# Patient Record
Sex: FEMALE | Race: WHITE | Employment: OTHER | URBAN - METROPOLITAN AREA
[De-identification: names, ages, dates, MRNs, and addresses within clinical notes are randomized per-mention and may not be internally consistent; named-entity substitution may affect disease eponyms.]

---

## 2017-02-07 ENCOUNTER — OFFICE VISIT (OUTPATIENT)
Dept: CARDIOLOGY CLINIC | Age: 77
End: 2017-02-07

## 2017-02-07 VITALS
RESPIRATION RATE: 16 BRPM | HEIGHT: 60 IN | WEIGHT: 241 LBS | SYSTOLIC BLOOD PRESSURE: 102 MMHG | OXYGEN SATURATION: 92 % | DIASTOLIC BLOOD PRESSURE: 58 MMHG | HEART RATE: 81 BPM | BODY MASS INDEX: 47.32 KG/M2

## 2017-02-07 DIAGNOSIS — Z95.2 S/P AVR: Primary | ICD-10-CM

## 2017-02-07 PROCEDURE — G8484 FLU IMMUNIZE NO ADMIN: HCPCS | Performed by: INTERNAL MEDICINE

## 2017-02-07 PROCEDURE — G8419 CALC BMI OUT NRM PARAM NOF/U: HCPCS | Performed by: INTERNAL MEDICINE

## 2017-02-07 PROCEDURE — 1036F TOBACCO NON-USER: CPT | Performed by: INTERNAL MEDICINE

## 2017-02-07 PROCEDURE — 99214 OFFICE O/P EST MOD 30 MIN: CPT | Performed by: INTERNAL MEDICINE

## 2017-02-07 PROCEDURE — 1123F ACP DISCUSS/DSCN MKR DOCD: CPT | Performed by: INTERNAL MEDICINE

## 2017-02-07 PROCEDURE — G8427 DOCREV CUR MEDS BY ELIG CLIN: HCPCS | Performed by: INTERNAL MEDICINE

## 2017-02-07 PROCEDURE — G8399 PT W/DXA RESULTS DOCUMENT: HCPCS | Performed by: INTERNAL MEDICINE

## 2017-02-07 PROCEDURE — G8598 ASA/ANTIPLAT THER USED: HCPCS | Performed by: INTERNAL MEDICINE

## 2017-02-07 PROCEDURE — 4040F PNEUMOC VAC/ADMIN/RCVD: CPT | Performed by: INTERNAL MEDICINE

## 2017-02-07 PROCEDURE — 1090F PRES/ABSN URINE INCON ASSESS: CPT | Performed by: INTERNAL MEDICINE

## 2017-05-15 ENCOUNTER — HOSPITAL ENCOUNTER (OUTPATIENT)
Dept: OTHER | Age: 77
Discharge: HOME | End: 2017-05-15
Attending: INTERNAL MEDICINE | Admitting: INTERNAL MEDICINE

## 2017-05-15 LAB
FERRITIN: 88 NG/ML (ref 15–150)
IRON: 108 UG/DL (ref 37–145)
PCT TRANSFERRIN: 46 % (ref 10–44)
TOTAL IRON BINDING CAPACITY: 233 UG/DL (ref 250–450)
UNSATURATED IRON BINDING CAPACITY: 125 UG/DL (ref 110–370)

## 2017-10-12 PROBLEM — I50.33 ACUTE ON CHRONIC DIASTOLIC HEART FAILURE (HCC): Status: ACTIVE | Noted: 2017-10-12

## 2017-10-12 PROBLEM — J44.9 COPD (CHRONIC OBSTRUCTIVE PULMONARY DISEASE) (HCC): Status: ACTIVE | Noted: 2017-10-12

## 2017-10-12 PROBLEM — G47.33 OSA (OBSTRUCTIVE SLEEP APNEA): Chronic | Status: ACTIVE | Noted: 2017-10-12

## 2017-11-07 ENCOUNTER — OFFICE VISIT (OUTPATIENT)
Dept: CARDIOLOGY CLINIC | Age: 77
End: 2017-11-07

## 2017-11-07 VITALS
BODY MASS INDEX: 43.98 KG/M2 | WEIGHT: 224 LBS | HEART RATE: 74 BPM | HEIGHT: 60 IN | SYSTOLIC BLOOD PRESSURE: 136 MMHG | DIASTOLIC BLOOD PRESSURE: 80 MMHG

## 2017-11-07 DIAGNOSIS — I05.2 MITRAL STENOSIS WITH INSUFFICIENCY, UNSPECIFIED ETIOLOGY: ICD-10-CM

## 2017-11-07 DIAGNOSIS — Z95.2 S/P AVR: Primary | ICD-10-CM

## 2017-11-07 PROCEDURE — G8427 DOCREV CUR MEDS BY ELIG CLIN: HCPCS | Performed by: INTERNAL MEDICINE

## 2017-11-07 PROCEDURE — 99214 OFFICE O/P EST MOD 30 MIN: CPT | Performed by: INTERNAL MEDICINE

## 2017-11-07 PROCEDURE — G8598 ASA/ANTIPLAT THER USED: HCPCS | Performed by: INTERNAL MEDICINE

## 2017-11-07 PROCEDURE — G8399 PT W/DXA RESULTS DOCUMENT: HCPCS | Performed by: INTERNAL MEDICINE

## 2017-11-07 PROCEDURE — 4040F PNEUMOC VAC/ADMIN/RCVD: CPT | Performed by: INTERNAL MEDICINE

## 2017-11-07 PROCEDURE — 1090F PRES/ABSN URINE INCON ASSESS: CPT | Performed by: INTERNAL MEDICINE

## 2017-11-07 PROCEDURE — G8417 CALC BMI ABV UP PARAM F/U: HCPCS | Performed by: INTERNAL MEDICINE

## 2017-11-07 PROCEDURE — 1036F TOBACCO NON-USER: CPT | Performed by: INTERNAL MEDICINE

## 2017-11-07 PROCEDURE — 1111F DSCHRG MED/CURRENT MED MERGE: CPT | Performed by: INTERNAL MEDICINE

## 2017-11-07 PROCEDURE — G8484 FLU IMMUNIZE NO ADMIN: HCPCS | Performed by: INTERNAL MEDICINE

## 2017-11-07 PROCEDURE — 1123F ACP DISCUSS/DSCN MKR DOCD: CPT | Performed by: INTERNAL MEDICINE

## 2017-11-07 NOTE — PROGRESS NOTES
CARDIOLOGY NOTE      11/7/2017    RE: Karina Lott  (1940)                               TO:  Dr. Anastasiya Severino MD      Thank you for involving me in taking care of your  patient Karina Lott, who is a  76y.o. year old      female with past medical  history of  CAD, HTN, hyperlipidimea, AVR , severe MS  is  seen today Patient  during this  visit has severe unchanged SOB, no CP was in hospital for CHF has seen CTS. Nohemy Ely Vitals:    11/07/17 1424   BP: 136/80   Pulse: 74       Current Outpatient Prescriptions   Medication Sig Dispense Refill    furosemide (LASIX) 40 MG tablet Take 1 tablet by mouth 2 times daily 60 tablet 3    hydrALAZINE (APRESOLINE) 25 MG tablet Take 25 mg by mouth 3 times daily      Cyanocobalamin (VITAMIN B 12 PO) Take 1,000 mcg by mouth daily      glimepiride (AMARYL) 2 MG tablet Take 2 mg by mouth every morning (before breakfast)      budesonide (PULMICORT) 0.5 MG/2ML nebulizer suspension Take 2 mLs by nebulization 2 times daily Dx Code COPD J44.9 60 ampule 5    ipratropium-albuterol (DUONEB) 0.5-2.5 (3) MG/3ML SOLN nebulizer solution Inhale 3 mLs into the lungs every 6 hours as needed for Shortness of Breath 360 mL 5    albuterol sulfate HFA (PROAIR HFA) 108 (90 BASE) MCG/ACT inhaler Inhale 2 puffs into the lungs 4 times daily 1 Inhaler 5    Respiratory Therapy Supplies (NEBULIZER AIR TUBE/PLUGS) MISC 1 Device by Does not apply route as needed (as needed) 1 each 0    omeprazole (PRILOSEC) 40 MG capsule Take 40 mg by mouth daily       metoprolol (LOPRESSOR) 25 MG tablet Take 25 mg by mouth 2 times daily.  losartan (COZAAR) 100 MG tablet Take 100 mg by mouth daily.  potassium chloride (K-DUR) 10 MEQ tablet Take 10 mEq by mouth daily.  multivitamin (THERAGRAN) per tablet Take 1 tablet by mouth daily.  simvastatin (ZOCOR) 20 MG tablet Take 20 mg by mouth nightly.  aspirin 81 MG tablet Take 81 mg by mouth daily.         vitamin D (ERGOCALCIFEROL) 52242 UNITS CAPS capsule Take 50,000 Units by mouth once a week 10/12/17 Patient states she takes this on Thursdays      ferrous sulfate 325 (65 FE) MG tablet Take 325 mg by mouth daily (with breakfast)        No current facility-administered medications for this visit. Allergies: Lisinopril  Past Medical History:   Diagnosis Date    CAD (coronary artery disease)     [facility]     Diabetes mellitus ([facility]     Gallstones     H/O cardiovascular stress test 9/2007, 9/2009, 7/2010 7/6/2010-Rest ef is 67%. Global LV systolic function is normal. No ECG changes. Unremarkable pharmacological stress test.    H/O complete electrocardiogram 5/13/2010    H/O Doppler ultrasound carotid doppler 2/2009, 8/2010 8/25/2010-heterogeneous irregular athresclerotic plaque in the R internal carotid artery.  H/O echocardiogram 11/07/2013    EF=>55%, normal LV systolic function, mild mitral regurg, severe mitral stenosis, severe pulmonary HTN, bioprosthetic aortic valve.  H/O echocardiogram 10/01/15    EF 60% Mildly hypertrophied LV with normal systolic function. prosthetic valve noted in aortic position. Mod pulmon HTN.  MR does not seem to be severe as in the past the mitral valve area 2.16 but heavily calcified and the mitral valve still appears to be stenotic.     H/O echocardiogram 07/13/2016    EF 60%, Mild LVH, Mod dilated LA, Mod Mitral Stenosis, Severe Pulm HTN, Significant valvular disease, Severe MS by pressure gradient    Hx of CABG     Hyperlipidemia     Hypertension     Murmur 1987    S/P AVR     Type II or unspecified type diabetes mellitus without mention of complication, not stated as uncontrolled      Past Surgical History:   Procedure Laterality Date    CARDIAC SURGERY      CORONARY ARTERY BYPASS GRAFT  4/2010    [facility]    CORONARY ARTERY BYPASS GRAFT  4/2010    [facility]    JOINT REPLACEMENT Bilateral     knees    KNEE SURGERY (L) 10/12/2017    AST 19 10/12/2017     TSH:    Lab Results   Component Value Date    TSH 3.0 01/14/2014         QUALITY MEASURES:  CAD:  Yes   CHOL LOWERING:  Yes- if No Why  ANTIPLATELET:  Yes - if No why  BETA BLOCKER    yes,   IF  NO WHY  SMOKING HISTORY no COUNSELLED no  ATRIAL FIBRILLATIONno ANTICOAG: no    Assessment/ Plan:     Patient seen , interviewed and examined        -   CONGESTIVE HEART FAILURE:  Patient has    diastolic  heart failure. Patient is currently   very symptomatic. Etiology of CHF is  heart valve disease  . Salt restriction emphasized. ,Encouraged daily monitoring of the patient's weight,Fluid intake and output    -Changes in medicines:   No  -Test ordered today;  No       This patient has NYHA   Class IV (Severe) CHF  resulting in the inability to carry on any physical activity without discomfort. Symptoms of heart failure or the anginal syndrome may be present even at rest. If any physical activity is undertaken, discomfort is increased. -     CORONARY ARTERY DISEASE: Patient  currently as per anginal classification has   CCS III - Symptoms with everyday living activities, i.e., moderate limitation           - changes in  treatment:   no  All CAD tests available in records reviewed. -  Hypertension: Patients blood pressure is normal. Patient is advised about low sodium diet. Present medical regimen will not be changed. -  LIPID MANAGEMENT:  Available lipid  lab data reviewed  and patient was given dietary advice. -   Changes  in medicines made: No     -  Test ordered today : No           -   DIABETES MELLITUS: Available pertinent lab data reviewed   and  patient was given dietary advice      -   Changes  in medicines made: No    -   Test ordered today : No            - AVR stable echo reviewed    - Severe MS     Unchanged, not a candidate for MVR, heavily calcified, no new options available yet  ?  Using TAVR prosthesis for MVR, will [facility] next Monday ( going there to do TAVR)      - severe pul HTN unchanged    echo reviewed

## 2017-11-21 ENCOUNTER — HOSPITAL ENCOUNTER (OUTPATIENT)
Dept: GENERAL RADIOLOGY | Age: 77
Discharge: HOME | End: 2017-11-21
Attending: INTERNAL MEDICINE | Admitting: INTERNAL MEDICINE

## 2017-11-21 LAB
ALBUMIN SERPL-MCNC: 4.2 GM/DL (ref 3.4–5)
ALP BLD-CCNC: 64 IU/L (ref 40–129)
ALT SERPL-CCNC: 8 U/L (ref 10–40)
ANION GAP SERPL CALCULATED.3IONS-SCNC: 14 MMOL/L (ref 4–16)
AST SERPL-CCNC: 19 IU/L (ref 15–37)
BACTERIA: ABNORMAL /HPF
BILIRUB SERPL-MCNC: 0.8 MG/DL (ref 0–1)
BILIRUBIN URINE: NEGATIVE MG/DL
BLOOD, URINE: NEGATIVE
BUN BLDV-MCNC: 27 MG/DL (ref 6–23)
CALCIUM SERPL-MCNC: 8.9 MG/DL (ref 8.3–10.6)
CHLORIDE BLD-SCNC: 98 MMOL/L (ref 99–110)
CLARITY: ABNORMAL
CO2: 27 MMOL/L (ref 21–32)
COLOR: ABNORMAL
CREAT SERPL-MCNC: 1.8 MG/DL (ref 0.6–1.1)
GFR AFRICAN AMERICAN: 33 ML/MIN/1.73M2
GFR NON-AFRICAN AMERICAN: 27 ML/MIN/1.73M2
GLUCOSE FASTING: 129 MG/DL (ref 70–99)
GLUCOSE, URINE: NEGATIVE MG/DL
KETONES, URINE: NEGATIVE MG/DL
LEUKOCYTE ESTERASE, URINE: ABNORMAL
MUCUS: ABNORMAL HPF
NITRITE URINE, QUANTITATIVE: NEGATIVE
PH, URINE: 6 (ref 5–8)
POTASSIUM SERPL-SCNC: 4.5 MMOL/L (ref 3.5–5.1)
PROTEIN UA: NEGATIVE MG/DL
RBC URINE: 3 /HPF (ref 0–6)
SODIUM BLD-SCNC: 139 MMOL/L (ref 135–145)
SPECIFIC GRAVITY UA: 1 (ref 1–1.03)
SQUAMOUS EPITHELIAL: 2 /HPF
TOTAL PROTEIN: 6.1 GM/DL (ref 6.4–8.2)
TRICHOMONAS: ABNORMAL /HPF
URIC ACID: 11 MG/DL (ref 2.6–6)
UROBILINOGEN, URINE: NORMAL MG/DL (ref 0.2–1)
WBC CLUMP: ABNORMAL /HPF
WBC UA: 181 /HPF (ref 0–5)

## 2018-01-01 ENCOUNTER — HOSPITAL ENCOUNTER (OUTPATIENT)
Age: 78
Setting detail: SPECIMEN
Discharge: HOME | End: 2018-11-02

## 2018-01-01 ENCOUNTER — HOSPITAL ENCOUNTER (OUTPATIENT)
Age: 78
Setting detail: SPECIMEN
Discharge: HOME | DRG: 291 | End: 2018-12-11
Payer: MEDICARE

## 2018-01-01 ENCOUNTER — HOSPITAL ENCOUNTER (OUTPATIENT)
Age: 78
Discharge: HOME | End: 2018-11-13

## 2018-01-01 ENCOUNTER — HOSPITAL ENCOUNTER (OUTPATIENT)
Dept: GENERAL RADIOLOGY | Age: 78
Discharge: HOME | End: 2018-02-14
Attending: INTERNAL MEDICINE | Admitting: INTERNAL MEDICINE

## 2018-01-01 ENCOUNTER — APPOINTMENT (OUTPATIENT)
Dept: GENERAL RADIOLOGY | Age: 78
DRG: 291 | End: 2018-01-01
Payer: MEDICARE

## 2018-01-01 ENCOUNTER — HOSPITAL ENCOUNTER (OUTPATIENT)
Age: 78
Discharge: HOME | End: 2018-11-27

## 2018-01-01 ENCOUNTER — HOSPITAL ENCOUNTER (OUTPATIENT)
Dept: GENERAL RADIOLOGY | Age: 78
Discharge: HOME | End: 2018-01-02
Attending: FAMILY MEDICINE | Admitting: FAMILY MEDICINE

## 2018-01-01 ENCOUNTER — TELEPHONE (OUTPATIENT)
Dept: CARDIOLOGY CLINIC | Age: 78
End: 2018-01-01

## 2018-01-01 ENCOUNTER — HOSPITAL ENCOUNTER (OUTPATIENT)
Dept: GENERAL RADIOLOGY | Age: 78
Discharge: HOME | End: 2018-06-07
Attending: INTERNAL MEDICINE | Admitting: INTERNAL MEDICINE

## 2018-01-01 ENCOUNTER — CARE COORDINATION (OUTPATIENT)
Dept: CASE MANAGEMENT | Age: 78
End: 2018-01-01

## 2018-01-01 ENCOUNTER — APPOINTMENT (OUTPATIENT)
Dept: INTERVENTIONAL RADIOLOGY/VASCULAR | Age: 78
DRG: 291 | End: 2018-01-01
Payer: MEDICARE

## 2018-01-01 ENCOUNTER — OFFICE VISIT (OUTPATIENT)
Dept: CARDIOLOGY CLINIC | Age: 78
End: 2018-01-01

## 2018-01-01 ENCOUNTER — HOSPITAL ENCOUNTER (OUTPATIENT)
Dept: GENERAL RADIOLOGY | Age: 78
Discharge: HOME | End: 2018-03-07
Attending: INTERNAL MEDICINE | Admitting: INTERNAL MEDICINE

## 2018-01-01 ENCOUNTER — HOSPITAL ENCOUNTER (OUTPATIENT)
Dept: GENERAL RADIOLOGY | Age: 78
Discharge: HOME | End: 2018-01-02
Attending: INTERNAL MEDICINE | Admitting: INTERNAL MEDICINE

## 2018-01-01 ENCOUNTER — APPOINTMENT (OUTPATIENT)
Dept: ULTRASOUND IMAGING | Age: 78
DRG: 291 | End: 2018-01-01
Payer: MEDICARE

## 2018-01-01 ENCOUNTER — HOSPITAL ENCOUNTER (OUTPATIENT)
Age: 78
Setting detail: SPECIMEN
Discharge: HOME | End: 2018-11-06

## 2018-01-01 ENCOUNTER — HOSPITAL ENCOUNTER (OUTPATIENT)
Age: 78
Discharge: HOME | End: 2018-11-16

## 2018-01-01 ENCOUNTER — APPOINTMENT (OUTPATIENT)
Dept: CT IMAGING | Age: 78
DRG: 291 | End: 2018-01-01
Payer: MEDICARE

## 2018-01-01 ENCOUNTER — HOSPITAL ENCOUNTER (OUTPATIENT)
Age: 78
Discharge: HOME | End: 2018-11-09
Payer: MEDICARE

## 2018-01-01 ENCOUNTER — HOSPITAL ENCOUNTER (OUTPATIENT)
Dept: GENERAL RADIOLOGY | Age: 78
Discharge: HOME | End: 2018-06-14
Attending: FAMILY MEDICINE | Admitting: FAMILY MEDICINE

## 2018-01-01 ENCOUNTER — HOSPITAL ENCOUNTER (OUTPATIENT)
Age: 78
Setting detail: SPECIMEN
Discharge: HOME | End: 2018-11-20
Payer: MEDICARE

## 2018-01-01 ENCOUNTER — HOSPITAL ENCOUNTER (OUTPATIENT)
Age: 78
Setting detail: SPECIMEN
Discharge: HOME | End: 2018-12-04

## 2018-01-01 ENCOUNTER — HOSPITAL ENCOUNTER (INPATIENT)
Age: 78
LOS: 9 days | Discharge: NURSING, REHAB OR LONG-TERM CARE | DRG: 291 | End: 2018-10-30
Attending: EMERGENCY MEDICINE | Admitting: HOSPITALIST
Payer: MEDICARE

## 2018-01-01 ENCOUNTER — HOSPITAL ENCOUNTER (INPATIENT)
Age: 78
LOS: 8 days | DRG: 291 | End: 2018-12-19
Attending: EMERGENCY MEDICINE | Admitting: INTERNAL MEDICINE
Payer: MEDICARE

## 2018-01-01 VITALS
TEMPERATURE: 99.1 F | HEIGHT: 60 IN | SYSTOLIC BLOOD PRESSURE: 64 MMHG | OXYGEN SATURATION: 98 % | WEIGHT: 229.28 LBS | BODY MASS INDEX: 45.01 KG/M2 | HEART RATE: 65 BPM | RESPIRATION RATE: 21 BRPM | DIASTOLIC BLOOD PRESSURE: 35 MMHG

## 2018-01-01 VITALS
SYSTOLIC BLOOD PRESSURE: 126 MMHG | RESPIRATION RATE: 22 BRPM | TEMPERATURE: 98.3 F | HEART RATE: 85 BPM | HEIGHT: 60 IN | WEIGHT: 213.9 LBS | DIASTOLIC BLOOD PRESSURE: 61 MMHG | BODY MASS INDEX: 41.99 KG/M2 | OXYGEN SATURATION: 98 %

## 2018-01-01 VITALS
SYSTOLIC BLOOD PRESSURE: 114 MMHG | HEIGHT: 60 IN | HEART RATE: 68 BPM | BODY MASS INDEX: 40.82 KG/M2 | DIASTOLIC BLOOD PRESSURE: 70 MMHG

## 2018-01-01 DIAGNOSIS — I25.810 CORONARY ARTERY DISEASE INVOLVING CORONARY BYPASS GRAFT OF NATIVE HEART WITHOUT ANGINA PECTORIS: Primary | ICD-10-CM

## 2018-01-01 DIAGNOSIS — I51.7 CARDIOMEGALY: ICD-10-CM

## 2018-01-01 DIAGNOSIS — R79.89 ELEVATED BRAIN NATRIURETIC PEPTIDE (BNP) LEVEL: ICD-10-CM

## 2018-01-01 DIAGNOSIS — I50.9 ACUTE ON CHRONIC CONGESTIVE HEART FAILURE, UNSPECIFIED HEART FAILURE TYPE (HCC): ICD-10-CM

## 2018-01-01 DIAGNOSIS — R77.8 TROPONIN LEVEL ELEVATED: ICD-10-CM

## 2018-01-01 DIAGNOSIS — J90 PLEURAL EFFUSION: ICD-10-CM

## 2018-01-01 DIAGNOSIS — Z95.2 S/P AVR: Chronic | ICD-10-CM

## 2018-01-01 DIAGNOSIS — R09.02 HYPOXIA: ICD-10-CM

## 2018-01-01 DIAGNOSIS — J96.91 RESPIRATORY FAILURE WITH HYPOXIA, UNSPECIFIED CHRONICITY (HCC): Primary | ICD-10-CM

## 2018-01-01 DIAGNOSIS — R77.8 ELEVATED TROPONIN: ICD-10-CM

## 2018-01-01 DIAGNOSIS — R79.89 SERUM CREATININE RAISED: ICD-10-CM

## 2018-01-01 DIAGNOSIS — R06.00 DYSPNEA, UNSPECIFIED TYPE: Primary | ICD-10-CM

## 2018-01-01 LAB
ALBUMIN SERPL-MCNC: 3.5 GM/DL (ref 3.4–5)
ALBUMIN SERPL-MCNC: 3.6 GM/DL (ref 3.4–5)
ALBUMIN SERPL-MCNC: 3.7 GM/DL (ref 3.4–5)
ALBUMIN SERPL-MCNC: 3.9 GM/DL (ref 3.4–5)
ALBUMIN SERPL-MCNC: 4.1 GM/DL (ref 3.4–5)
ALBUMIN SERPL-MCNC: 4.1 GM/DL (ref 3.4–5)
ALBUMIN SERPL-MCNC: 4.5 GM/DL (ref 3.4–5)
ALP BLD-CCNC: 57 IU/L (ref 40–129)
ALP BLD-CCNC: 64 IU/L (ref 40–128)
ALP BLD-CCNC: 73 IU/L (ref 40–128)
ALP BLD-CCNC: 85 IU/L (ref 40–128)
ALT SERPL-CCNC: 10 U/L (ref 10–40)
ALT SERPL-CCNC: 10 U/L (ref 10–40)
ALT SERPL-CCNC: 8 U/L (ref 10–40)
ALT SERPL-CCNC: 9 U/L (ref 10–40)
ANION GAP SERPL CALCULATED.3IONS-SCNC: 11 MMOL/L (ref 4–16)
ANION GAP SERPL CALCULATED.3IONS-SCNC: 12 MMOL/L (ref 4–16)
ANION GAP SERPL CALCULATED.3IONS-SCNC: 13 MMOL/L (ref 4–16)
ANION GAP SERPL CALCULATED.3IONS-SCNC: 14 MMOL/L (ref 4–16)
ANION GAP SERPL CALCULATED.3IONS-SCNC: 15 MMOL/L (ref 4–16)
ANION GAP SERPL CALCULATED.3IONS-SCNC: 16 MMOL/L (ref 4–16)
ANION GAP SERPL CALCULATED.3IONS-SCNC: 17 MMOL/L (ref 4–16)
ANION GAP SERPL CALCULATED.3IONS-SCNC: 18 MMOL/L (ref 4–16)
ANION GAP SERPL CALCULATED.3IONS-SCNC: 19 MMOL/L (ref 4–16)
ANION GAP SERPL CALCULATED.3IONS-SCNC: 19 MMOL/L (ref 4–16)
ANION GAP SERPL CALCULATED.3IONS-SCNC: 20 MMOL/L (ref 4–16)
ANION GAP SERPL CALCULATED.3IONS-SCNC: 21 MMOL/L (ref 4–16)
ANION GAP SERPL CALCULATED.3IONS-SCNC: 25 MMOL/L (ref 4–16)
ANION GAP SERPL CALCULATED.3IONS-SCNC: 27 MMOL/L (ref 4–16)
ANION GAP SERPL CALCULATED.3IONS-SCNC: 9 MMOL/L (ref 4–16)
APTT: 35.5 SECONDS (ref 21.2–33)
AST SERPL-CCNC: 17 IU/L (ref 15–37)
AST SERPL-CCNC: 18 IU/L (ref 15–37)
AST SERPL-CCNC: 22 IU/L (ref 15–37)
AST SERPL-CCNC: 38 IU/L (ref 15–37)
BACTERIA: ABNORMAL /HPF
BASE EXCESS MIXED: 6.4 (ref 0–2.3)
BASOPHILS ABSOLUTE: 0 K/CU MM
BASOPHILS ABSOLUTE: 0.1 K/CU MM
BASOPHILS RELATIVE PERCENT: 0.2 % (ref 0–1)
BASOPHILS RELATIVE PERCENT: 0.3 % (ref 0–1)
BASOPHILS RELATIVE PERCENT: 0.4 % (ref 0–1)
BASOPHILS RELATIVE PERCENT: 0.5 % (ref 0–1)
BASOPHILS RELATIVE PERCENT: 0.5 % (ref 0–1)
BASOPHILS RELATIVE PERCENT: 0.7 % (ref 0–1)
BASOPHILS RELATIVE PERCENT: 0.7 % (ref 0–1)
BASOPHILS RELATIVE PERCENT: 0.9 % (ref 0–1)
BILIRUB SERPL-MCNC: 0.6 MG/DL (ref 0–1)
BILIRUB SERPL-MCNC: 0.7 MG/DL (ref 0–1)
BILIRUB SERPL-MCNC: 0.8 MG/DL (ref 0–1)
BILIRUB SERPL-MCNC: 0.9 MG/DL (ref 0–1)
BILIRUBIN URINE: NEGATIVE MG/DL
BLOOD, URINE: ABNORMAL
BUN BLDV-MCNC: 104 MG/DL (ref 6–23)
BUN BLDV-MCNC: 110 MG/DL (ref 6–23)
BUN BLDV-MCNC: 162 MG/DL (ref 6–23)
BUN BLDV-MCNC: 39 MG/DL (ref 6–23)
BUN BLDV-MCNC: 43 MG/DL (ref 6–23)
BUN BLDV-MCNC: 45 MG/DL (ref 6–23)
BUN BLDV-MCNC: 46 MG/DL (ref 6–23)
BUN BLDV-MCNC: 46 MG/DL (ref 6–23)
BUN BLDV-MCNC: 52 MG/DL (ref 6–23)
BUN BLDV-MCNC: 59 MG/DL (ref 6–23)
BUN BLDV-MCNC: 59 MG/DL (ref 6–23)
BUN BLDV-MCNC: 65 MG/DL (ref 6–23)
BUN BLDV-MCNC: 66 MG/DL (ref 6–23)
BUN BLDV-MCNC: 73 MG/DL (ref 6–23)
BUN BLDV-MCNC: 73 MG/DL (ref 6–23)
BUN BLDV-MCNC: 75 MG/DL (ref 6–23)
BUN BLDV-MCNC: 75 MG/DL (ref 6–23)
BUN BLDV-MCNC: 76 MG/DL (ref 6–23)
BUN BLDV-MCNC: 77 MG/DL (ref 6–23)
BUN BLDV-MCNC: 78 MG/DL (ref 6–23)
BUN BLDV-MCNC: 79 MG/DL (ref 6–23)
BUN BLDV-MCNC: 80 MG/DL (ref 6–23)
BUN BLDV-MCNC: 83 MG/DL (ref 6–23)
BUN BLDV-MCNC: 90 MG/DL (ref 6–23)
BUN BLDV-MCNC: 91 MG/DL (ref 6–23)
CALCIUM SERPL-MCNC: 6.5 MG/DL (ref 8.3–10.6)
CALCIUM SERPL-MCNC: 7.5 MG/DL (ref 8.3–10.6)
CALCIUM SERPL-MCNC: 8.3 MG/DL (ref 8.3–10.6)
CALCIUM SERPL-MCNC: 8.4 MG/DL (ref 8.3–10.6)
CALCIUM SERPL-MCNC: 8.4 MG/DL (ref 8.3–10.6)
CALCIUM SERPL-MCNC: 8.5 MG/DL (ref 8.3–10.6)
CALCIUM SERPL-MCNC: 8.6 MG/DL (ref 8.3–10.6)
CALCIUM SERPL-MCNC: 8.6 MG/DL (ref 8.3–10.6)
CALCIUM SERPL-MCNC: 8.7 MG/DL (ref 8.3–10.6)
CALCIUM SERPL-MCNC: 8.8 MG/DL (ref 8.3–10.6)
CALCIUM SERPL-MCNC: 8.9 MG/DL (ref 8.3–10.6)
CALCIUM SERPL-MCNC: 9 MG/DL (ref 8.3–10.6)
CALCIUM SERPL-MCNC: 9.1 MG/DL (ref 8.3–10.6)
CALCIUM SERPL-MCNC: 9.2 MG/DL (ref 8.3–10.6)
CALCIUM SERPL-MCNC: 9.2 MG/DL (ref 8.3–10.6)
CALCIUM SERPL-MCNC: 9.3 MG/DL (ref 8.3–10.6)
CALCIUM SERPL-MCNC: 9.6 MG/DL (ref 8.3–10.6)
CHLORIDE BLD-SCNC: 100 MMOL/L (ref 99–110)
CHLORIDE BLD-SCNC: 101 MMOL/L (ref 99–110)
CHLORIDE BLD-SCNC: 101 MMOL/L (ref 99–110)
CHLORIDE BLD-SCNC: 102 MMOL/L (ref 99–110)
CHLORIDE BLD-SCNC: 104 MMOL/L (ref 99–110)
CHLORIDE BLD-SCNC: 91 MMOL/L (ref 99–110)
CHLORIDE BLD-SCNC: 91 MMOL/L (ref 99–110)
CHLORIDE BLD-SCNC: 92 MMOL/L (ref 99–110)
CHLORIDE BLD-SCNC: 93 MMOL/L (ref 99–110)
CHLORIDE BLD-SCNC: 94 MMOL/L (ref 99–110)
CHLORIDE BLD-SCNC: 95 MMOL/L (ref 99–110)
CHLORIDE BLD-SCNC: 96 MMOL/L (ref 99–110)
CHLORIDE BLD-SCNC: 97 MMOL/L (ref 99–110)
CHLORIDE BLD-SCNC: 98 MMOL/L (ref 99–110)
CHLORIDE BLD-SCNC: 99 MMOL/L (ref 99–110)
CHOLESTEROL: 125 MG/DL
CHOLESTEROL: 83 MG/DL
CLARITY: ABNORMAL
CO2: 20 MMOL/L (ref 21–32)
CO2: 21 MMOL/L (ref 21–32)
CO2: 23 MMOL/L (ref 21–32)
CO2: 23 MMOL/L (ref 21–32)
CO2: 24 MMOL/L (ref 21–32)
CO2: 25 MMOL/L (ref 21–32)
CO2: 25 MMOL/L (ref 21–32)
CO2: 26 MMOL/L (ref 21–32)
CO2: 27 MMOL/L (ref 21–32)
CO2: 28 MMOL/L (ref 21–32)
CO2: 29 MMOL/L (ref 21–32)
CO2: 29 MMOL/L (ref 21–32)
CO2: 30 MMOL/L (ref 21–32)
CO2: 31 MMOL/L (ref 21–32)
CO2: 31 MMOL/L (ref 21–32)
CO2: 32 MMOL/L (ref 21–32)
CO2: 33 MMOL/L (ref 21–32)
CO2: 33 MMOL/L (ref 21–32)
CO2: 34 MMOL/L (ref 21–32)
CO2: 34 MMOL/L (ref 21–32)
CO2: 35 MMOL/L (ref 21–32)
CO2: 36 MMOL/L (ref 21–32)
COLOR: YELLOW
CREAT SERPL-MCNC: 1.9 MG/DL (ref 0.6–1.1)
CREAT SERPL-MCNC: 2 MG/DL (ref 0.6–1.1)
CREAT SERPL-MCNC: 2.4 MG/DL (ref 0.6–1.1)
CREAT SERPL-MCNC: 2.4 MG/DL (ref 0.6–1.1)
CREAT SERPL-MCNC: 2.5 MG/DL (ref 0.6–1.1)
CREAT SERPL-MCNC: 2.6 MG/DL (ref 0.6–1.1)
CREAT SERPL-MCNC: 2.7 MG/DL (ref 0.6–1.1)
CREAT SERPL-MCNC: 2.7 MG/DL (ref 0.6–1.1)
CREAT SERPL-MCNC: 2.8 MG/DL (ref 0.6–1.1)
CREAT SERPL-MCNC: 2.9 MG/DL (ref 0.6–1.1)
CREAT SERPL-MCNC: 3.2 MG/DL (ref 0.6–1.1)
CREAT SERPL-MCNC: 3.3 MG/DL (ref 0.6–1.1)
CREAT SERPL-MCNC: 3.3 MG/DL (ref 0.6–1.1)
CREAT SERPL-MCNC: 3.6 MG/DL (ref 0.6–1.1)
CREAT SERPL-MCNC: 3.8 MG/DL (ref 0.6–1.1)
CREAT SERPL-MCNC: 3.8 MG/DL (ref 0.6–1.1)
CREAT SERPL-MCNC: 4 MG/DL (ref 0.6–1.1)
CREAT SERPL-MCNC: 4 MG/DL (ref 0.6–1.1)
CREAT SERPL-MCNC: 4.4 MG/DL (ref 0.6–1.1)
CREAT SERPL-MCNC: 4.5 MG/DL (ref 0.6–1.1)
CREAT SERPL-MCNC: 4.8 MG/DL (ref 0.6–1.1)
CREAT SERPL-MCNC: 4.9 MG/DL (ref 0.6–1.1)
CREAT SERPL-MCNC: 5.5 MG/DL (ref 0.6–1.1)
CREAT SERPL-MCNC: 5.5 MG/DL (ref 0.6–1.1)
CREAT SERPL-MCNC: 5.7 MG/DL (ref 0.6–1.1)
CREATININE URINE: 32.5 MG/DL (ref 28–217)
CREATININE URINE: 34 MG/DL (ref 28–217)
CREATININE URINE: 60.2 MG/DL
DIFFERENTIAL TYPE: ABNORMAL
EKG ATRIAL RATE: 312 BPM
EKG DIAGNOSIS: NORMAL
EKG P AXIS: 128 DEGREES
EKG P-R INTERVAL: 246 MS
EKG Q-T INTERVAL: 370 MS
EKG QRS DURATION: 88 MS
EKG QTC CALCULATION (BAZETT): 426 MS
EKG R AXIS: 209 DEGREES
EKG T AXIS: -34 DEGREES
EKG VENTRICULAR RATE: 80 BPM
EOSINOPHILS ABSOLUTE: 0.1 K/CU MM
EOSINOPHILS ABSOLUTE: 0.2 K/CU MM
EOSINOPHILS ABSOLUTE: 0.3 K/CU MM
EOSINOPHILS ABSOLUTE: 0.4 K/CU MM
EOSINOPHILS ABSOLUTE: 0.5 K/CU MM
EOSINOPHILS RELATIVE PERCENT: 0.8 % (ref 0–3)
EOSINOPHILS RELATIVE PERCENT: 1.2 % (ref 0–3)
EOSINOPHILS RELATIVE PERCENT: 1.2 % (ref 0–3)
EOSINOPHILS RELATIVE PERCENT: 2.2 % (ref 0–3)
EOSINOPHILS RELATIVE PERCENT: 2.3 % (ref 0–3)
EOSINOPHILS RELATIVE PERCENT: 2.3 % (ref 0–3)
EOSINOPHILS RELATIVE PERCENT: 2.5 % (ref 0–3)
EOSINOPHILS RELATIVE PERCENT: 2.9 % (ref 0–3)
EOSINOPHILS RELATIVE PERCENT: 3 % (ref 0–3)
EOSINOPHILS RELATIVE PERCENT: 3.2 % (ref 0–3)
EOSINOPHILS RELATIVE PERCENT: 5.4 % (ref 0–3)
ESTIMATED AVERAGE GLUCOSE: 140 MG/DL
ESTIMATED AVERAGE GLUCOSE: 160 MG/DL
GFR AFRICAN AMERICAN: 10 ML/MIN/1.73M2
GFR AFRICAN AMERICAN: 11 ML/MIN/1.73M2
GFR AFRICAN AMERICAN: 11 ML/MIN/1.73M2
GFR AFRICAN AMERICAN: 12 ML/MIN/1.73M2
GFR AFRICAN AMERICAN: 13 ML/MIN/1.73M2
GFR AFRICAN AMERICAN: 13 ML/MIN/1.73M2
GFR AFRICAN AMERICAN: 14 ML/MIN/1.73M2
GFR AFRICAN AMERICAN: 14 ML/MIN/1.73M2
GFR AFRICAN AMERICAN: 15 ML/MIN/1.73M2
GFR AFRICAN AMERICAN: 16 ML/MIN/1.73M2
GFR AFRICAN AMERICAN: 16 ML/MIN/1.73M2
GFR AFRICAN AMERICAN: 17 ML/MIN/1.73M2
GFR AFRICAN AMERICAN: 19 ML/MIN/1.73M2
GFR AFRICAN AMERICAN: 20 ML/MIN/1.73M2
GFR AFRICAN AMERICAN: 21 ML/MIN/1.73M2
GFR AFRICAN AMERICAN: 22 ML/MIN/1.73M2
GFR AFRICAN AMERICAN: 24 ML/MIN/1.73M2
GFR AFRICAN AMERICAN: 24 ML/MIN/1.73M2
GFR AFRICAN AMERICAN: 29 ML/MIN/1.73M2
GFR AFRICAN AMERICAN: 31 ML/MIN/1.73M2
GFR AFRICAN AMERICAN: 9 ML/MIN/1.73M2
GFR NON-AFRICAN AMERICAN: 10 ML/MIN/1.73M2
GFR NON-AFRICAN AMERICAN: 11 ML/MIN/1.73M2
GFR NON-AFRICAN AMERICAN: 12 ML/MIN/1.73M2
GFR NON-AFRICAN AMERICAN: 13 ML/MIN/1.73M2
GFR NON-AFRICAN AMERICAN: 13 ML/MIN/1.73M2
GFR NON-AFRICAN AMERICAN: 14 ML/MIN/1.73M2
GFR NON-AFRICAN AMERICAN: 16 ML/MIN/1.73M2
GFR NON-AFRICAN AMERICAN: 17 ML/MIN/1.73M2
GFR NON-AFRICAN AMERICAN: 17 ML/MIN/1.73M2
GFR NON-AFRICAN AMERICAN: 18 ML/MIN/1.73M2
GFR NON-AFRICAN AMERICAN: 19 ML/MIN/1.73M2
GFR NON-AFRICAN AMERICAN: 24 ML/MIN/1.73M2
GFR NON-AFRICAN AMERICAN: 26 ML/MIN/1.73M2
GFR NON-AFRICAN AMERICAN: 7 ML/MIN/1.73M2
GFR NON-AFRICAN AMERICAN: 9 ML/MIN/1.73M2
GLUCOSE BLD-MCNC: 111 MG/DL (ref 70–99)
GLUCOSE BLD-MCNC: 116 MG/DL (ref 70–99)
GLUCOSE BLD-MCNC: 117 MG/DL (ref 70–99)
GLUCOSE BLD-MCNC: 119 MG/DL (ref 70–99)
GLUCOSE BLD-MCNC: 120 MG/DL (ref 70–99)
GLUCOSE BLD-MCNC: 122 MG/DL (ref 70–99)
GLUCOSE BLD-MCNC: 122 MG/DL (ref 70–99)
GLUCOSE BLD-MCNC: 127 MG/DL (ref 70–99)
GLUCOSE BLD-MCNC: 128 MG/DL (ref 70–99)
GLUCOSE BLD-MCNC: 129 MG/DL (ref 70–99)
GLUCOSE BLD-MCNC: 129 MG/DL (ref 70–99)
GLUCOSE BLD-MCNC: 131 MG/DL (ref 70–99)
GLUCOSE BLD-MCNC: 132 MG/DL (ref 70–99)
GLUCOSE BLD-MCNC: 134 MG/DL (ref 70–99)
GLUCOSE BLD-MCNC: 136 MG/DL (ref 70–99)
GLUCOSE BLD-MCNC: 136 MG/DL (ref 70–99)
GLUCOSE BLD-MCNC: 139 MG/DL (ref 70–99)
GLUCOSE BLD-MCNC: 140 MG/DL (ref 70–99)
GLUCOSE BLD-MCNC: 140 MG/DL (ref 70–99)
GLUCOSE BLD-MCNC: 142 MG/DL (ref 70–99)
GLUCOSE BLD-MCNC: 143 MG/DL (ref 70–99)
GLUCOSE BLD-MCNC: 143 MG/DL (ref 70–99)
GLUCOSE BLD-MCNC: 144 MG/DL (ref 70–99)
GLUCOSE BLD-MCNC: 145 MG/DL (ref 70–99)
GLUCOSE BLD-MCNC: 146 MG/DL (ref 70–99)
GLUCOSE BLD-MCNC: 147 MG/DL (ref 70–99)
GLUCOSE BLD-MCNC: 148 MG/DL (ref 70–99)
GLUCOSE BLD-MCNC: 148 MG/DL (ref 70–99)
GLUCOSE BLD-MCNC: 149 MG/DL (ref 70–99)
GLUCOSE BLD-MCNC: 150 MG/DL (ref 70–99)
GLUCOSE BLD-MCNC: 151 MG/DL (ref 70–99)
GLUCOSE BLD-MCNC: 151 MG/DL (ref 70–99)
GLUCOSE BLD-MCNC: 152 MG/DL (ref 70–99)
GLUCOSE BLD-MCNC: 152 MG/DL (ref 70–99)
GLUCOSE BLD-MCNC: 153 MG/DL (ref 70–99)
GLUCOSE BLD-MCNC: 155 MG/DL (ref 70–99)
GLUCOSE BLD-MCNC: 156 MG/DL (ref 70–99)
GLUCOSE BLD-MCNC: 158 MG/DL (ref 70–99)
GLUCOSE BLD-MCNC: 159 MG/DL (ref 70–99)
GLUCOSE BLD-MCNC: 163 MG/DL (ref 70–99)
GLUCOSE BLD-MCNC: 165 MG/DL (ref 70–99)
GLUCOSE BLD-MCNC: 167 MG/DL (ref 70–99)
GLUCOSE BLD-MCNC: 168 MG/DL (ref 70–99)
GLUCOSE BLD-MCNC: 169 MG/DL (ref 70–99)
GLUCOSE BLD-MCNC: 170 MG/DL (ref 70–99)
GLUCOSE BLD-MCNC: 171 MG/DL (ref 70–99)
GLUCOSE BLD-MCNC: 172 MG/DL (ref 70–99)
GLUCOSE BLD-MCNC: 173 MG/DL (ref 70–99)
GLUCOSE BLD-MCNC: 178 MG/DL (ref 70–99)
GLUCOSE BLD-MCNC: 182 MG/DL (ref 70–99)
GLUCOSE BLD-MCNC: 183 MG/DL (ref 70–99)
GLUCOSE BLD-MCNC: 185 MG/DL (ref 70–99)
GLUCOSE BLD-MCNC: 188 MG/DL (ref 70–99)
GLUCOSE BLD-MCNC: 189 MG/DL (ref 70–99)
GLUCOSE BLD-MCNC: 190 MG/DL (ref 70–99)
GLUCOSE BLD-MCNC: 191 MG/DL (ref 70–99)
GLUCOSE BLD-MCNC: 193 MG/DL (ref 70–99)
GLUCOSE BLD-MCNC: 196 MG/DL (ref 70–99)
GLUCOSE BLD-MCNC: 199 MG/DL (ref 70–99)
GLUCOSE BLD-MCNC: 199 MG/DL (ref 70–99)
GLUCOSE BLD-MCNC: 201 MG/DL (ref 70–99)
GLUCOSE BLD-MCNC: 205 MG/DL (ref 70–99)
GLUCOSE BLD-MCNC: 207 MG/DL (ref 70–99)
GLUCOSE BLD-MCNC: 214 MG/DL (ref 70–99)
GLUCOSE BLD-MCNC: 219 MG/DL (ref 70–99)
GLUCOSE BLD-MCNC: 220 MG/DL (ref 70–99)
GLUCOSE BLD-MCNC: 225 MG/DL (ref 70–99)
GLUCOSE BLD-MCNC: 228 MG/DL (ref 70–99)
GLUCOSE BLD-MCNC: 233 MG/DL (ref 70–99)
GLUCOSE BLD-MCNC: 254 MG/DL (ref 70–99)
GLUCOSE BLD-MCNC: 262 MG/DL (ref 70–99)
GLUCOSE BLD-MCNC: 285 MG/DL (ref 70–99)
GLUCOSE BLD-MCNC: 291 MG/DL (ref 70–99)
GLUCOSE BLD-MCNC: 57 MG/DL (ref 70–99)
GLUCOSE BLD-MCNC: 99 MG/DL (ref 70–99)
GLUCOSE FASTING: 179 MG/DL (ref 70–99)
GLUCOSE, URINE: NEGATIVE MG/DL
HBA1C MFR BLD: 6.5 % (ref 4.2–6.3)
HBA1C MFR BLD: 7.2 % (ref 4.2–6.3)
HBV SURFACE AB TITR SER: <3.5 {TITER}
HCO3 VENOUS: 35.2 MMOL/L (ref 19–25)
HCT VFR BLD CALC: 32.5 % (ref 37–47)
HCT VFR BLD CALC: 32.8 % (ref 37–47)
HCT VFR BLD CALC: 33.7 % (ref 37–47)
HCT VFR BLD CALC: 34.9 % (ref 37–47)
HCT VFR BLD CALC: 35.4 % (ref 37–47)
HCT VFR BLD CALC: 35.7 % (ref 37–47)
HCT VFR BLD CALC: 35.8 % (ref 37–47)
HCT VFR BLD CALC: 36 % (ref 37–47)
HCT VFR BLD CALC: 36 % (ref 37–47)
HCT VFR BLD CALC: 36.5 % (ref 37–47)
HCT VFR BLD CALC: 36.8 % (ref 37–47)
HCT VFR BLD CALC: 37.1 % (ref 37–47)
HCT VFR BLD CALC: 37.9 % (ref 37–47)
HCT VFR BLD CALC: 38.8 % (ref 37–47)
HCT VFR BLD CALC: 39.6 % (ref 37–47)
HCT VFR BLD CALC: 40.5 % (ref 37–47)
HDLC SERPL-MCNC: 42 MG/DL
HDLC SERPL-MCNC: 42 MG/DL
HEMOGLOBIN: 10.3 GM/DL (ref 12.5–16)
HEMOGLOBIN: 10.4 GM/DL (ref 12.5–16)
HEMOGLOBIN: 10.6 GM/DL (ref 12.5–16)
HEMOGLOBIN: 10.8 GM/DL (ref 12.5–16)
HEMOGLOBIN: 10.9 GM/DL (ref 12.5–16)
HEMOGLOBIN: 10.9 GM/DL (ref 12.5–16)
HEMOGLOBIN: 11 GM/DL (ref 12.5–16)
HEMOGLOBIN: 11.2 GM/DL (ref 12.5–16)
HEMOGLOBIN: 11.2 GM/DL (ref 12.5–16)
HEMOGLOBIN: 11.3 GM/DL (ref 12.5–16)
HEMOGLOBIN: 11.3 GM/DL (ref 12.5–16)
HEMOGLOBIN: 11.6 GM/DL (ref 12.5–16)
HEMOGLOBIN: 11.6 GM/DL (ref 12.5–16)
HEMOGLOBIN: 11.7 GM/DL (ref 12.5–16)
HEMOGLOBIN: 12 GM/DL (ref 12.5–16)
HEMOGLOBIN: 13 GM/DL (ref 12.5–16)
HEPATITIS B CORE TOTAL ANTIBODY: NEGATIVE
HEPATITIS B SURFACE ANTIGEN: NON REACTIVE
HYALINE CASTS: 5 /LPF
IMMATURE NEUTROPHIL %: 0.2 % (ref 0–0.43)
IMMATURE NEUTROPHIL %: 0.2 % (ref 0–0.43)
IMMATURE NEUTROPHIL %: 0.3 % (ref 0–0.43)
IMMATURE NEUTROPHIL %: 0.3 % (ref 0–0.43)
IMMATURE NEUTROPHIL %: 0.4 % (ref 0–0.43)
IMMATURE NEUTROPHIL %: 0.5 % (ref 0–0.43)
IMMATURE NEUTROPHIL %: 0.6 % (ref 0–0.43)
IMMATURE NEUTROPHIL %: 0.6 % (ref 0–0.43)
IMMATURE NEUTROPHIL %: 0.7 % (ref 0–0.43)
INR BLD: 1.37 INDEX
KETONES, URINE: NEGATIVE MG/DL
LDL CHOLESTEROL DIRECT: 39 MG/DL
LDL CHOLESTEROL DIRECT: 66 MG/DL
LEUKOCYTE ESTERASE, URINE: ABNORMAL
LIPASE: 34 IU/L (ref 13–60)
LV EF: 55 %
LVEF MODALITY: NORMAL
LYMPHOCYTES ABSOLUTE: 0.4 K/CU MM
LYMPHOCYTES ABSOLUTE: 0.5 K/CU MM
LYMPHOCYTES ABSOLUTE: 0.6 K/CU MM
LYMPHOCYTES ABSOLUTE: 0.7 K/CU MM
LYMPHOCYTES ABSOLUTE: 0.7 K/CU MM
LYMPHOCYTES ABSOLUTE: 0.9 K/CU MM
LYMPHOCYTES ABSOLUTE: 1.1 K/CU MM
LYMPHOCYTES RELATIVE PERCENT: 13.2 % (ref 24–44)
LYMPHOCYTES RELATIVE PERCENT: 4.9 % (ref 24–44)
LYMPHOCYTES RELATIVE PERCENT: 5.8 % (ref 24–44)
LYMPHOCYTES RELATIVE PERCENT: 6 % (ref 24–44)
LYMPHOCYTES RELATIVE PERCENT: 6 % (ref 24–44)
LYMPHOCYTES RELATIVE PERCENT: 6.2 % (ref 24–44)
LYMPHOCYTES RELATIVE PERCENT: 6.6 % (ref 24–44)
LYMPHOCYTES RELATIVE PERCENT: 6.7 % (ref 24–44)
LYMPHOCYTES RELATIVE PERCENT: 7 % (ref 24–44)
LYMPHOCYTES RELATIVE PERCENT: 7.1 % (ref 24–44)
LYMPHOCYTES RELATIVE PERCENT: 7.6 % (ref 24–44)
MAGNESIUM: 0.9 MG/DL (ref 1.8–2.4)
MAGNESIUM: 1.1 MG/DL (ref 1.8–2.4)
MAGNESIUM: 1.2 MG/DL (ref 1.8–2.4)
MAGNESIUM: 1.4 MG/DL (ref 1.8–2.4)
MAGNESIUM: 1.5 MG/DL (ref 1.8–2.4)
MAGNESIUM: 1.5 MG/DL (ref 1.8–2.4)
MAGNESIUM: 1.6 MG/DL (ref 1.8–2.4)
MAGNESIUM: 1.6 MG/DL (ref 1.8–2.4)
MAGNESIUM: 1.7 MG/DL (ref 1.8–2.4)
MAGNESIUM: 1.7 MG/DL (ref 1.8–2.4)
MAGNESIUM: 1.8 MG/DL (ref 1.8–2.4)
MAGNESIUM: 2 MG/DL (ref 1.8–2.4)
MCH RBC QN AUTO: 32.4 PG (ref 27–31)
MCH RBC QN AUTO: 33.2 PG (ref 27–31)
MCH RBC QN AUTO: 33.3 PG (ref 27–31)
MCH RBC QN AUTO: 33.4 PG (ref 27–31)
MCH RBC QN AUTO: 33.7 PG (ref 27–31)
MCH RBC QN AUTO: 33.8 PG (ref 27–31)
MCH RBC QN AUTO: 33.9 PG (ref 27–31)
MCH RBC QN AUTO: 33.9 PG (ref 27–31)
MCH RBC QN AUTO: 34.1 PG (ref 27–31)
MCH RBC QN AUTO: 34.2 PG (ref 27–31)
MCH RBC QN AUTO: 34.2 PG (ref 27–31)
MCH RBC QN AUTO: 34.3 PG (ref 27–31)
MCH RBC QN AUTO: 34.3 PG (ref 27–31)
MCH RBC QN AUTO: 34.8 PG (ref 27–31)
MCHC RBC AUTO-ENTMCNC: 29.3 % (ref 32–36)
MCHC RBC AUTO-ENTMCNC: 29.9 % (ref 32–36)
MCHC RBC AUTO-ENTMCNC: 30.3 % (ref 32–36)
MCHC RBC AUTO-ENTMCNC: 30.4 % (ref 32–36)
MCHC RBC AUTO-ENTMCNC: 30.6 % (ref 32–36)
MCHC RBC AUTO-ENTMCNC: 30.9 % (ref 32–36)
MCHC RBC AUTO-ENTMCNC: 31 % (ref 32–36)
MCHC RBC AUTO-ENTMCNC: 31.2 % (ref 32–36)
MCHC RBC AUTO-ENTMCNC: 31.3 % (ref 32–36)
MCHC RBC AUTO-ENTMCNC: 31.4 % (ref 32–36)
MCHC RBC AUTO-ENTMCNC: 31.4 % (ref 32–36)
MCHC RBC AUTO-ENTMCNC: 31.5 % (ref 32–36)
MCHC RBC AUTO-ENTMCNC: 31.6 % (ref 32–36)
MCHC RBC AUTO-ENTMCNC: 31.7 % (ref 32–36)
MCHC RBC AUTO-ENTMCNC: 31.7 % (ref 32–36)
MCHC RBC AUTO-ENTMCNC: 32.1 % (ref 32–36)
MCV RBC AUTO: 101 FL (ref 78–100)
MCV RBC AUTO: 106.5 FL (ref 78–100)
MCV RBC AUTO: 106.9 FL (ref 78–100)
MCV RBC AUTO: 107.7 FL (ref 78–100)
MCV RBC AUTO: 108 FL (ref 78–100)
MCV RBC AUTO: 108.2 FL (ref 78–100)
MCV RBC AUTO: 108.3 FL (ref 78–100)
MCV RBC AUTO: 108.8 FL (ref 78–100)
MCV RBC AUTO: 109.1 FL (ref 78–100)
MCV RBC AUTO: 109.4 FL (ref 78–100)
MCV RBC AUTO: 109.7 FL (ref 78–100)
MCV RBC AUTO: 109.8 FL (ref 78–100)
MCV RBC AUTO: 110.9 FL (ref 78–100)
MCV RBC AUTO: 112.6 FL (ref 78–100)
MCV RBC AUTO: 114.5 FL (ref 78–100)
MCV RBC AUTO: 115 FL (ref 78–100)
MICROALBUMIN/CREAT 24H UR: 1.6 MG/DL
MICROALBUMIN/CREAT 24H UR: <1.2 MG/DL
MICROALBUMIN/CREAT UR-RTO: 49.2 MG/G CREAT (ref 0–30)
MICROALBUMIN/CREAT UR-RTO: NORMAL MG/G CREAT (ref 0–30)
MONOCYTES ABSOLUTE: 0.6 K/CU MM
MONOCYTES ABSOLUTE: 0.7 K/CU MM
MONOCYTES ABSOLUTE: 0.8 K/CU MM
MONOCYTES ABSOLUTE: 1 K/CU MM
MONOCYTES ABSOLUTE: 1.1 K/CU MM
MONOCYTES ABSOLUTE: 1.1 K/CU MM
MONOCYTES ABSOLUTE: 1.2 K/CU MM
MONOCYTES RELATIVE PERCENT: 10 % (ref 0–4)
MONOCYTES RELATIVE PERCENT: 10.7 % (ref 0–4)
MONOCYTES RELATIVE PERCENT: 11 % (ref 0–4)
MONOCYTES RELATIVE PERCENT: 11.3 % (ref 0–4)
MONOCYTES RELATIVE PERCENT: 12.3 % (ref 0–4)
MONOCYTES RELATIVE PERCENT: 7.1 % (ref 0–4)
MONOCYTES RELATIVE PERCENT: 8 % (ref 0–4)
MONOCYTES RELATIVE PERCENT: 8.6 % (ref 0–4)
MONOCYTES RELATIVE PERCENT: 8.7 % (ref 0–4)
MUCUS: ABNORMAL HPF
NITRITE URINE, QUANTITATIVE: NEGATIVE
NUCLEATED RBC %: 0 %
NUCLEATED RBC %: 0.2 %
NUCLEATED RBC %: 0.5 %
NUCLEATED RBC %: 0.8 %
NUCLEATED RBC %: 0.9 %
NUCLEATED RBC %: 1.2 %
NUCLEATED RBC %: 1.3 %
NUCLEATED RBC %: 1.4 %
NUCLEATED RBC %: 1.6 %
NUCLEATED RBC %: 2.1 %
NUCLEATED RBC %: 2.5 %
O2 SAT, VEN: 39.6 % (ref 50–70)
PARATHYROID HORMONE INTACT: 60
PCO2, VEN: 70 MMHG (ref 38–52)
PDW BLD-RTO: 13.1 % (ref 11.7–14.9)
PDW BLD-RTO: 13.4 % (ref 11.7–14.9)
PDW BLD-RTO: 15 % (ref 11.7–14.9)
PDW BLD-RTO: 15.3 % (ref 11.7–14.9)
PDW BLD-RTO: 15.7 % (ref 11.7–14.9)
PDW BLD-RTO: 16.2 % (ref 11.7–14.9)
PDW BLD-RTO: 16.5 % (ref 11.7–14.9)
PDW BLD-RTO: 16.6 % (ref 11.7–14.9)
PDW BLD-RTO: 16.7 % (ref 11.7–14.9)
PDW BLD-RTO: 16.7 % (ref 11.7–14.9)
PDW BLD-RTO: 16.8 % (ref 11.7–14.9)
PDW BLD-RTO: 16.9 % (ref 11.7–14.9)
PDW BLD-RTO: 17.2 % (ref 11.7–14.9)
PDW BLD-RTO: 17.5 % (ref 11.7–14.9)
PDW BLD-RTO: 17.5 % (ref 11.7–14.9)
PDW BLD-RTO: 17.7 % (ref 11.7–14.9)
PH VENOUS: 7.31 (ref 7.32–7.42)
PH, URINE: 5 (ref 5–8)
PHOSPHORUS: 2.9 MG/DL (ref 2.5–4.9)
PHOSPHORUS: 3 MG/DL (ref 2.5–4.9)
PHOSPHORUS: 3.2 MG/DL (ref 2.5–4.9)
PHOSPHORUS: 3.2 MG/DL (ref 2.5–4.9)
PHOSPHORUS: 3.4 MG/DL (ref 2.5–4.9)
PHOSPHORUS: 3.6 MG/DL (ref 2.5–4.9)
PHOSPHORUS: 3.8 MG/DL (ref 2.5–4.9)
PHOSPHORUS: 3.9 MG/DL (ref 2.5–4.9)
PLATELET # BLD: 101 K/CU MM (ref 140–440)
PLATELET # BLD: 101 K/CU MM (ref 140–440)
PLATELET # BLD: 102 K/CU MM (ref 140–440)
PLATELET # BLD: 110 K/CU MM (ref 140–440)
PLATELET # BLD: 113 K/CU MM (ref 140–440)
PLATELET # BLD: 113 K/CU MM (ref 140–440)
PLATELET # BLD: 114 K/CU MM (ref 140–440)
PLATELET # BLD: 115 K/CU MM (ref 140–440)
PLATELET # BLD: 119 K/CU MM (ref 140–440)
PLATELET # BLD: 123 K/CU MM (ref 140–440)
PLATELET # BLD: 124 K/CU MM (ref 140–440)
PLATELET # BLD: 141 K/CU MM (ref 140–440)
PLATELET # BLD: 142 K/CU MM (ref 140–440)
PLATELET # BLD: 150 K/CU MM (ref 140–440)
PLATELET # BLD: 173 K/CU MM (ref 140–440)
PLATELET # BLD: 176 K/CU MM (ref 140–440)
PMV BLD AUTO: 11.4 FL (ref 7.5–11.1)
PMV BLD AUTO: 11.7 FL (ref 7.5–11.1)
PMV BLD AUTO: 11.7 FL (ref 7.5–11.1)
PMV BLD AUTO: 12.1 FL (ref 7.5–11.1)
PMV BLD AUTO: 12.3 FL (ref 7.5–11.1)
PMV BLD AUTO: 12.4 FL (ref 7.5–11.1)
PMV BLD AUTO: 12.4 FL (ref 7.5–11.1)
PMV BLD AUTO: 12.6 FL (ref 7.5–11.1)
PMV BLD AUTO: 12.6 FL (ref 7.5–11.1)
PMV BLD AUTO: 12.9 FL (ref 7.5–11.1)
PMV BLD AUTO: 13.1 FL (ref 7.5–11.1)
PMV BLD AUTO: 13.6 FL (ref 7.5–11.1)
PO2, VEN: 21 MMHG (ref 28–48)
POTASSIUM SERPL-SCNC: 3.3 MMOL/L (ref 3.5–5.1)
POTASSIUM SERPL-SCNC: 3.6 MMOL/L (ref 3.5–5.1)
POTASSIUM SERPL-SCNC: 3.6 MMOL/L (ref 3.5–5.1)
POTASSIUM SERPL-SCNC: 3.7 MMOL/L (ref 3.5–5.1)
POTASSIUM SERPL-SCNC: 3.9 MMOL/L (ref 3.5–5.1)
POTASSIUM SERPL-SCNC: 4 MMOL/L (ref 3.5–5.1)
POTASSIUM SERPL-SCNC: 4.1 MMOL/L (ref 3.5–5.1)
POTASSIUM SERPL-SCNC: 4.2 MMOL/L (ref 3.5–5.1)
POTASSIUM SERPL-SCNC: 4.3 MMOL/L (ref 3.5–5.1)
POTASSIUM SERPL-SCNC: 4.4 MMOL/L (ref 3.5–5.1)
POTASSIUM SERPL-SCNC: 4.5 MMOL/L (ref 3.5–5.1)
POTASSIUM SERPL-SCNC: 4.6 MMOL/L (ref 3.5–5.1)
POTASSIUM SERPL-SCNC: 4.7 MMOL/L (ref 3.5–5.1)
POTASSIUM SERPL-SCNC: 4.8 MMOL/L (ref 3.5–5.1)
POTASSIUM SERPL-SCNC: 5 MMOL/L (ref 3.5–5.1)
POTASSIUM SERPL-SCNC: 5.1 MMOL/L (ref 3.5–5.1)
POTASSIUM SERPL-SCNC: 5.1 MMOL/L (ref 3.5–5.1)
POTASSIUM SERPL-SCNC: 5.2 MMOL/L (ref 3.5–5.1)
POTASSIUM SERPL-SCNC: 5.4 MMOL/L (ref 3.5–5.1)
PRO-BNP: ABNORMAL PG/ML
PROTEIN UA: NEGATIVE MG/DL
PROTHROMBIN TIME: 15.6 SECONDS (ref 9.12–12.5)
RBC # BLD: 2.96 M/CU MM (ref 4.2–5.4)
RBC # BLD: 3.08 M/CU MM (ref 4.2–5.4)
RBC # BLD: 3.13 M/CU MM (ref 4.2–5.4)
RBC # BLD: 3.18 M/CU MM (ref 4.2–5.4)
RBC # BLD: 3.2 M/CU MM (ref 4.2–5.4)
RBC # BLD: 3.23 M/CU MM (ref 4.2–5.4)
RBC # BLD: 3.29 M/CU MM (ref 4.2–5.4)
RBC # BLD: 3.3 M/CU MM (ref 4.2–5.4)
RBC # BLD: 3.3 M/CU MM (ref 4.2–5.4)
RBC # BLD: 3.31 M/CU MM (ref 4.2–5.4)
RBC # BLD: 3.31 M/CU MM (ref 4.2–5.4)
RBC # BLD: 3.39 M/CU MM (ref 4.2–5.4)
RBC # BLD: 3.39 M/CU MM (ref 4.2–5.4)
RBC # BLD: 3.5 M/CU MM (ref 4.2–5.4)
RBC # BLD: 3.61 M/CU MM (ref 4.2–5.4)
RBC # BLD: 4.01 M/CU MM (ref 4.2–5.4)
RBC URINE: 9 /HPF (ref 0–6)
REASON FOR REJECTION: NORMAL
REJECTED TEST: NORMAL
SEGMENTED NEUTROPHILS ABSOLUTE COUNT: 6.1 K/CU MM
SEGMENTED NEUTROPHILS ABSOLUTE COUNT: 6.8 K/CU MM
SEGMENTED NEUTROPHILS ABSOLUTE COUNT: 7.2 K/CU MM
SEGMENTED NEUTROPHILS ABSOLUTE COUNT: 7.3 K/CU MM
SEGMENTED NEUTROPHILS ABSOLUTE COUNT: 7.5 K/CU MM
SEGMENTED NEUTROPHILS ABSOLUTE COUNT: 7.8 K/CU MM
SEGMENTED NEUTROPHILS ABSOLUTE COUNT: 8.2 K/CU MM
SEGMENTED NEUTROPHILS ABSOLUTE COUNT: 8.4 K/CU MM
SEGMENTED NEUTROPHILS ABSOLUTE COUNT: 9.7 K/CU MM
SEGMENTED NEUTROPHILS RELATIVE PERCENT: 71.6 % (ref 36–66)
SEGMENTED NEUTROPHILS RELATIVE PERCENT: 77.4 % (ref 36–66)
SEGMENTED NEUTROPHILS RELATIVE PERCENT: 78 % (ref 36–66)
SEGMENTED NEUTROPHILS RELATIVE PERCENT: 78.1 % (ref 36–66)
SEGMENTED NEUTROPHILS RELATIVE PERCENT: 79.7 % (ref 36–66)
SEGMENTED NEUTROPHILS RELATIVE PERCENT: 80.1 % (ref 36–66)
SEGMENTED NEUTROPHILS RELATIVE PERCENT: 80.5 % (ref 36–66)
SEGMENTED NEUTROPHILS RELATIVE PERCENT: 80.7 % (ref 36–66)
SEGMENTED NEUTROPHILS RELATIVE PERCENT: 82.3 % (ref 36–66)
SEGMENTED NEUTROPHILS RELATIVE PERCENT: 83.1 % (ref 36–66)
SEGMENTED NEUTROPHILS RELATIVE PERCENT: 84.5 % (ref 36–66)
SODIUM BLD-SCNC: 136 MMOL/L (ref 135–145)
SODIUM BLD-SCNC: 137 MMOL/L (ref 135–145)
SODIUM BLD-SCNC: 139 MMOL/L (ref 135–145)
SODIUM BLD-SCNC: 139 MMOL/L (ref 135–145)
SODIUM BLD-SCNC: 140 MMOL/L (ref 135–145)
SODIUM BLD-SCNC: 141 MMOL/L (ref 135–145)
SODIUM BLD-SCNC: 142 MMOL/L (ref 135–145)
SODIUM BLD-SCNC: 143 MMOL/L (ref 135–145)
SODIUM BLD-SCNC: 144 MMOL/L (ref 135–145)
SODIUM BLD-SCNC: 146 MMOL/L (ref 135–145)
SODIUM URINE: 53 MMOL/L (ref 35–167)
SOURCE: NORMAL
SPECIFIC GRAVITY UA: 1.01 (ref 1–1.03)
SQUAMOUS EPITHELIAL: 2 /HPF
TOTAL CK: 73 IU/L (ref 26–140)
TOTAL IMMATURE NEUTOROPHIL: 0.02 K/CU MM
TOTAL IMMATURE NEUTOROPHIL: 0.02 K/CU MM
TOTAL IMMATURE NEUTOROPHIL: 0.03 K/CU MM
TOTAL IMMATURE NEUTOROPHIL: 0.03 K/CU MM
TOTAL IMMATURE NEUTOROPHIL: 0.04 K/CU MM
TOTAL IMMATURE NEUTOROPHIL: 0.04 K/CU MM
TOTAL IMMATURE NEUTOROPHIL: 0.05 K/CU MM
TOTAL IMMATURE NEUTOROPHIL: 0.05 K/CU MM
TOTAL IMMATURE NEUTOROPHIL: 0.06 K/CU MM
TOTAL IMMATURE NEUTOROPHIL: 0.06 K/CU MM
TOTAL IMMATURE NEUTOROPHIL: 0.07 K/CU MM
TOTAL NUCLEATED RBC: 0 K/CU MM
TOTAL NUCLEATED RBC: 0.1 K/CU MM
TOTAL NUCLEATED RBC: 0.2 K/CU MM
TOTAL NUCLEATED RBC: 0.2 K/CU MM
TOTAL NUCLEATED RBC: 0.3 K/CU MM
TOTAL PROTEIN: 5.4 GM/DL (ref 6.4–8.2)
TOTAL PROTEIN: 5.9 GM/DL (ref 6.4–8.2)
TOTAL PROTEIN: 6.2 GM/DL (ref 6.4–8.2)
TOTAL PROTEIN: 6.3 GM/DL (ref 6.4–8.2)
TRICHOMONAS: ABNORMAL /HPF
TRIGL SERPL-MCNC: 112 MG/DL
TRIGL SERPL-MCNC: 75 MG/DL
TROPONIN T: 0.05 NG/ML
TROPONIN T: 0.06 NG/ML
TROPONIN T: 0.07 NG/ML
TROPONIN T: 0.22 NG/ML
TSH HIGH SENSITIVITY: 2.84 UIU/ML (ref 0.27–4.2)
TSH HIGH SENSITIVITY: 3.2 UIU/ML (ref 0.27–4.2)
TSH HIGH SENSITIVITY: 4.18 UIU/ML (ref 0.27–4.2)
URIC ACID: 5.3 MG/DL (ref 2.6–6)
URIC ACID: 9.4 MG/DL (ref 2.6–6)
UROBILINOGEN, URINE: NORMAL MG/DL (ref 0.2–1)
WBC # BLD: 10.5 K/CU MM (ref 4–10.5)
WBC # BLD: 12 K/CU MM (ref 4–10.5)
WBC # BLD: 5.6 K/CU MM (ref 4–10.5)
WBC # BLD: 7.1 K/CU MM (ref 4–10.5)
WBC # BLD: 7.6 K/CU MM (ref 4–10.5)
WBC # BLD: 8.2 K/CU MM (ref 4–10.5)
WBC # BLD: 8.6 K/CU MM (ref 4–10.5)
WBC # BLD: 8.7 K/CU MM (ref 4–10.5)
WBC # BLD: 9.2 K/CU MM (ref 4–10.5)
WBC # BLD: 9.3 K/CU MM (ref 4–10.5)
WBC # BLD: 9.4 K/CU MM (ref 4–10.5)
WBC # BLD: 9.5 K/CU MM (ref 4–10.5)
WBC # BLD: 9.8 K/CU MM (ref 4–10.5)
WBC # BLD: 9.9 K/CU MM (ref 4–10.5)
WBC CLUMP: ABNORMAL /HPF
WBC UA: 106 /HPF (ref 0–5)

## 2018-01-01 PROCEDURE — 36415 COLL VENOUS BLD VENIPUNCTURE: CPT

## 2018-01-01 PROCEDURE — 99211 OFF/OP EST MAY X REQ PHY/QHP: CPT

## 2018-01-01 PROCEDURE — 96374 THER/PROPH/DIAG INJ IV PUSH: CPT

## 2018-01-01 PROCEDURE — 6370000000 HC RX 637 (ALT 250 FOR IP): Performed by: INTERNAL MEDICINE

## 2018-01-01 PROCEDURE — 6360000002 HC RX W HCPCS: Performed by: HOSPITALIST

## 2018-01-01 PROCEDURE — 83735 ASSAY OF MAGNESIUM: CPT

## 2018-01-01 PROCEDURE — 6360000002 HC RX W HCPCS: Performed by: INTERNAL MEDICINE

## 2018-01-01 PROCEDURE — 94640 AIRWAY INHALATION TREATMENT: CPT

## 2018-01-01 PROCEDURE — 2060000000 HC ICU INTERMEDIATE R&B

## 2018-01-01 PROCEDURE — 80048 BASIC METABOLIC PNL TOTAL CA: CPT

## 2018-01-01 PROCEDURE — 71045 X-RAY EXAM CHEST 1 VIEW: CPT

## 2018-01-01 PROCEDURE — 6360000002 HC RX W HCPCS: Performed by: NURSE PRACTITIONER

## 2018-01-01 PROCEDURE — 84100 ASSAY OF PHOSPHORUS: CPT

## 2018-01-01 PROCEDURE — 6370000000 HC RX 637 (ALT 250 FOR IP): Performed by: NURSE PRACTITIONER

## 2018-01-01 PROCEDURE — 84484 ASSAY OF TROPONIN QUANT: CPT

## 2018-01-01 PROCEDURE — 2140000000 HC CCU INTERMEDIATE R&B

## 2018-01-01 PROCEDURE — 94761 N-INVAS EAR/PLS OXIMETRY MLT: CPT

## 2018-01-01 PROCEDURE — 82962 GLUCOSE BLOOD TEST: CPT

## 2018-01-01 PROCEDURE — 76937 US GUIDE VASCULAR ACCESS: CPT | Performed by: RADIOLOGY

## 2018-01-01 PROCEDURE — 74018 RADEX ABDOMEN 1 VIEW: CPT

## 2018-01-01 PROCEDURE — 1123F ACP DISCUSS/DSCN MKR DOCD: CPT | Performed by: INTERNAL MEDICINE

## 2018-01-01 PROCEDURE — P9047 ALBUMIN (HUMAN), 25%, 50ML: HCPCS | Performed by: INTERNAL MEDICINE

## 2018-01-01 PROCEDURE — C1751 CATH, INF, PER/CENT/MIDLINE: HCPCS

## 2018-01-01 PROCEDURE — 99213 OFFICE O/P EST LOW 20 MIN: CPT

## 2018-01-01 PROCEDURE — 2580000003 HC RX 258: Performed by: NURSE PRACTITIONER

## 2018-01-01 PROCEDURE — 99232 SBSQ HOSP IP/OBS MODERATE 35: CPT | Performed by: INTERNAL MEDICINE

## 2018-01-01 PROCEDURE — 99285 EMERGENCY DEPT VISIT HI MDM: CPT

## 2018-01-01 PROCEDURE — 5A1D70Z PERFORMANCE OF URINARY FILTRATION, INTERMITTENT, LESS THAN 6 HOURS PER DAY: ICD-10-PCS | Performed by: INTERNAL MEDICINE

## 2018-01-01 PROCEDURE — 36569 INSJ PICC 5 YR+ W/O IMAGING: CPT | Performed by: SURGERY

## 2018-01-01 PROCEDURE — 76937 US GUIDE VASCULAR ACCESS: CPT | Performed by: SURGERY

## 2018-01-01 PROCEDURE — 93010 ELECTROCARDIOGRAM REPORT: CPT | Performed by: INTERNAL MEDICINE

## 2018-01-01 PROCEDURE — 83880 ASSAY OF NATRIURETIC PEPTIDE: CPT

## 2018-01-01 PROCEDURE — 2700000000 HC OXYGEN THERAPY PER DAY

## 2018-01-01 PROCEDURE — 85730 THROMBOPLASTIN TIME PARTIAL: CPT

## 2018-01-01 PROCEDURE — 84443 ASSAY THYROID STIM HORMONE: CPT

## 2018-01-01 PROCEDURE — 99222 1ST HOSP IP/OBS MODERATE 55: CPT | Performed by: INTERNAL MEDICINE

## 2018-01-01 PROCEDURE — 97530 THERAPEUTIC ACTIVITIES: CPT

## 2018-01-01 PROCEDURE — 1090F PRES/ABSN URINE INCON ASSESS: CPT | Performed by: INTERNAL MEDICINE

## 2018-01-01 PROCEDURE — 2580000003 HC RX 258

## 2018-01-01 PROCEDURE — G8598 ASA/ANTIPLAT THER USED: HCPCS | Performed by: INTERNAL MEDICINE

## 2018-01-01 PROCEDURE — 94760 N-INVAS EAR/PLS OXIMETRY 1: CPT

## 2018-01-01 PROCEDURE — 97166 OT EVAL MOD COMPLEX 45 MIN: CPT

## 2018-01-01 PROCEDURE — 77001 FLUOROGUIDE FOR VEIN DEVICE: CPT

## 2018-01-01 PROCEDURE — 36558 INSERT TUNNELED CV CATH: CPT

## 2018-01-01 PROCEDURE — 6360000002 HC RX W HCPCS: Performed by: EMERGENCY MEDICINE

## 2018-01-01 PROCEDURE — 51702 INSERT TEMP BLADDER CATH: CPT

## 2018-01-01 PROCEDURE — 99223 1ST HOSP IP/OBS HIGH 75: CPT | Performed by: INTERNAL MEDICINE

## 2018-01-01 PROCEDURE — G8978 MOBILITY CURRENT STATUS: HCPCS

## 2018-01-01 PROCEDURE — 36592 COLLECT BLOOD FROM PICC: CPT

## 2018-01-01 PROCEDURE — 99233 SBSQ HOSP IP/OBS HIGH 50: CPT | Performed by: INTERNAL MEDICINE

## 2018-01-01 PROCEDURE — C1752 CATH,HEMODIALYSIS,SHORT-TERM: HCPCS

## 2018-01-01 PROCEDURE — 96375 TX/PRO/DX INJ NEW DRUG ADDON: CPT

## 2018-01-01 PROCEDURE — 94660 CPAP INITIATION&MGMT: CPT

## 2018-01-01 PROCEDURE — 99221 1ST HOSP IP/OBS SF/LOW 40: CPT | Performed by: FAMILY MEDICINE

## 2018-01-01 PROCEDURE — 99231 SBSQ HOSP IP/OBS SF/LOW 25: CPT | Performed by: FAMILY MEDICINE

## 2018-01-01 PROCEDURE — 90937 HEMODIALYSIS REPEATED EVAL: CPT

## 2018-01-01 PROCEDURE — 82800 BLOOD PH: CPT

## 2018-01-01 PROCEDURE — 85027 COMPLETE CBC AUTOMATED: CPT

## 2018-01-01 PROCEDURE — 93925 LOWER EXTREMITY STUDY: CPT

## 2018-01-01 PROCEDURE — 4040F PNEUMOC VAC/ADMIN/RCVD: CPT | Performed by: INTERNAL MEDICINE

## 2018-01-01 PROCEDURE — G8987 SELF CARE CURRENT STATUS: HCPCS

## 2018-01-01 PROCEDURE — 80053 COMPREHEN METABOLIC PANEL: CPT

## 2018-01-01 PROCEDURE — 96365 THER/PROPH/DIAG IV INF INIT: CPT

## 2018-01-01 PROCEDURE — G8979 MOBILITY GOAL STATUS: HCPCS

## 2018-01-01 PROCEDURE — 2580000003 HC RX 258: Performed by: INTERNAL MEDICINE

## 2018-01-01 PROCEDURE — 1036F TOBACCO NON-USER: CPT | Performed by: INTERNAL MEDICINE

## 2018-01-01 PROCEDURE — 87340 HEPATITIS B SURFACE AG IA: CPT

## 2018-01-01 PROCEDURE — 83690 ASSAY OF LIPASE: CPT

## 2018-01-01 PROCEDURE — 85025 COMPLETE CBC W/AUTO DIFF WBC: CPT

## 2018-01-01 PROCEDURE — C1894 INTRO/SHEATH, NON-LASER: HCPCS

## 2018-01-01 PROCEDURE — 81001 URINALYSIS AUTO W/SCOPE: CPT

## 2018-01-01 PROCEDURE — C1881 DIALYSIS ACCESS SYSTEM: HCPCS

## 2018-01-01 PROCEDURE — 97162 PT EVAL MOD COMPLEX 30 MIN: CPT

## 2018-01-01 PROCEDURE — 1101F PT FALLS ASSESS-DOCD LE1/YR: CPT | Performed by: INTERNAL MEDICINE

## 2018-01-01 PROCEDURE — G0008 ADMIN INFLUENZA VIRUS VAC: HCPCS | Performed by: HOSPITALIST

## 2018-01-01 PROCEDURE — 2709999900 HC NON-CHARGEABLE SUPPLY

## 2018-01-01 PROCEDURE — 36556 INSERT NON-TUNNEL CV CATH: CPT

## 2018-01-01 PROCEDURE — 76937 US GUIDE VASCULAR ACCESS: CPT

## 2018-01-01 PROCEDURE — 76775 US EXAM ABDO BACK WALL LIM: CPT

## 2018-01-01 PROCEDURE — 98960 EDU&TRN PT SELF-MGMT NQHP 1: CPT

## 2018-01-01 PROCEDURE — 6370000000 HC RX 637 (ALT 250 FOR IP): Performed by: HOSPITALIST

## 2018-01-01 PROCEDURE — 82550 ASSAY OF CK (CPK): CPT

## 2018-01-01 PROCEDURE — 86704 HEP B CORE ANTIBODY TOTAL: CPT

## 2018-01-01 PROCEDURE — G8417 CALC BMI ABV UP PARAM F/U: HCPCS | Performed by: INTERNAL MEDICINE

## 2018-01-01 PROCEDURE — 93005 ELECTROCARDIOGRAM TRACING: CPT | Performed by: PHYSICIAN ASSISTANT

## 2018-01-01 PROCEDURE — 82805 BLOOD GASES W/O2 SATURATION: CPT

## 2018-01-01 PROCEDURE — 84300 ASSAY OF URINE SODIUM: CPT

## 2018-01-01 PROCEDURE — 99231 SBSQ HOSP IP/OBS SF/LOW 25: CPT | Performed by: INTERNAL MEDICINE

## 2018-01-01 PROCEDURE — G8399 PT W/DXA RESULTS DOCUMENT: HCPCS | Performed by: INTERNAL MEDICINE

## 2018-01-01 PROCEDURE — 85610 PROTHROMBIN TIME: CPT

## 2018-01-01 PROCEDURE — G8988 SELF CARE GOAL STATUS: HCPCS

## 2018-01-01 PROCEDURE — 36556 INSERT NON-TUNNEL CV CATH: CPT | Performed by: RADIOLOGY

## 2018-01-01 PROCEDURE — G8427 DOCREV CUR MEDS BY ELIG CLIN: HCPCS | Performed by: INTERNAL MEDICINE

## 2018-01-01 PROCEDURE — 71250 CT THORAX DX C-: CPT

## 2018-01-01 PROCEDURE — 99214 OFFICE O/P EST MOD 30 MIN: CPT | Performed by: INTERNAL MEDICINE

## 2018-01-01 PROCEDURE — 82570 ASSAY OF URINE CREATININE: CPT

## 2018-01-01 PROCEDURE — 90686 IIV4 VACC NO PRSV 0.5 ML IM: CPT | Performed by: HOSPITALIST

## 2018-01-01 PROCEDURE — 6370000000 HC RX 637 (ALT 250 FOR IP): Performed by: EMERGENCY MEDICINE

## 2018-01-01 PROCEDURE — 86706 HEP B SURFACE ANTIBODY: CPT

## 2018-01-01 PROCEDURE — 93306 TTE W/DOPPLER COMPLETE: CPT

## 2018-01-01 PROCEDURE — 02HV33Z INSERTION OF INFUSION DEVICE INTO SUPERIOR VENA CAVA, PERCUTANEOUS APPROACH: ICD-10-PCS | Performed by: SURGERY

## 2018-01-01 PROCEDURE — P9045 ALBUMIN (HUMAN), 5%, 250 ML: HCPCS | Performed by: INTERNAL MEDICINE

## 2018-01-01 RX ORDER — SODIUM POLYSTYRENE SULFONATE 15 G/60ML
15 SUSPENSION ORAL; RECTAL ONCE
Status: DISCONTINUED | OUTPATIENT
Start: 2018-01-01 | End: 2018-01-01

## 2018-01-01 RX ORDER — MIDODRINE HYDROCHLORIDE 5 MG/1
10 TABLET ORAL
Status: DISCONTINUED | OUTPATIENT
Start: 2018-01-01 | End: 2018-01-01 | Stop reason: HOSPADM

## 2018-01-01 RX ORDER — SIMVASTATIN 20 MG
20 TABLET ORAL NIGHTLY
Status: DISCONTINUED | OUTPATIENT
Start: 2018-01-01 | End: 2018-01-01 | Stop reason: HOSPADM

## 2018-01-01 RX ORDER — MAGNESIUM SULFATE IN WATER 40 MG/ML
2 INJECTION, SOLUTION INTRAVENOUS ONCE
Status: COMPLETED | OUTPATIENT
Start: 2018-01-01 | End: 2018-01-01

## 2018-01-01 RX ORDER — DOBUTAMINE HYDROCHLORIDE 200 MG/100ML
5 INJECTION INTRAVENOUS CONTINUOUS
Status: DISCONTINUED | OUTPATIENT
Start: 2018-01-01 | End: 2018-01-01

## 2018-01-01 RX ORDER — NICOTINE POLACRILEX 4 MG
15 LOZENGE BUCCAL PRN
Status: DISCONTINUED | OUTPATIENT
Start: 2018-01-01 | End: 2018-01-01 | Stop reason: HOSPADM

## 2018-01-01 RX ORDER — ALBUMIN (HUMAN) 12.5 G/50ML
12.5 SOLUTION INTRAVENOUS
Status: COMPLETED | OUTPATIENT
Start: 2018-01-01 | End: 2018-01-01

## 2018-01-01 RX ORDER — FERROUS SULFATE 325(65) MG
325 TABLET ORAL
Status: DISCONTINUED | OUTPATIENT
Start: 2018-01-01 | End: 2018-01-01 | Stop reason: HOSPADM

## 2018-01-01 RX ORDER — DEXTROSE MONOHYDRATE 50 MG/ML
100 INJECTION, SOLUTION INTRAVENOUS PRN
Status: DISCONTINUED | OUTPATIENT
Start: 2018-01-01 | End: 2018-01-01 | Stop reason: HOSPADM

## 2018-01-01 RX ORDER — METOLAZONE 2.5 MG/1
2.5 TABLET ORAL EVERY OTHER DAY
Status: DISCONTINUED | OUTPATIENT
Start: 2018-01-01 | End: 2018-01-01 | Stop reason: HOSPADM

## 2018-01-01 RX ORDER — DEXTROSE MONOHYDRATE 25 G/50ML
12.5 INJECTION, SOLUTION INTRAVENOUS PRN
Status: DISCONTINUED | OUTPATIENT
Start: 2018-01-01 | End: 2018-01-01 | Stop reason: HOSPADM

## 2018-01-01 RX ORDER — ALBUMIN (HUMAN) 12.5 G/50ML
25 SOLUTION INTRAVENOUS
Status: COMPLETED | OUTPATIENT
Start: 2018-01-01 | End: 2018-01-01

## 2018-01-01 RX ORDER — FUROSEMIDE 10 MG/ML
60 INJECTION INTRAMUSCULAR; INTRAVENOUS EVERY 8 HOURS
Status: DISCONTINUED | OUTPATIENT
Start: 2018-01-01 | End: 2018-01-01

## 2018-01-01 RX ORDER — HEPARIN SODIUM 1000 [USP'U]/ML
2600 INJECTION, SOLUTION INTRAVENOUS; SUBCUTANEOUS
Status: COMPLETED | OUTPATIENT
Start: 2018-01-01 | End: 2018-01-01

## 2018-01-01 RX ORDER — DOPAMINE HYDROCHLORIDE 160 MG/100ML
2.5 INJECTION, SOLUTION INTRAVENOUS CONTINUOUS
Status: DISCONTINUED | OUTPATIENT
Start: 2018-01-01 | End: 2018-01-01

## 2018-01-01 RX ORDER — HEPARIN SODIUM 5000 [USP'U]/ML
5000 INJECTION, SOLUTION INTRAVENOUS; SUBCUTANEOUS EVERY 8 HOURS SCHEDULED
Status: DISCONTINUED | OUTPATIENT
Start: 2018-01-01 | End: 2018-01-01 | Stop reason: HOSPADM

## 2018-01-01 RX ORDER — DOCUSATE SODIUM 100 MG/1
100 CAPSULE, LIQUID FILLED ORAL 2 TIMES DAILY
Status: DISCONTINUED | OUTPATIENT
Start: 2018-01-01 | End: 2018-01-01 | Stop reason: HOSPADM

## 2018-01-01 RX ORDER — TORSEMIDE 20 MG/1
40 TABLET ORAL DAILY
Status: DISCONTINUED | OUTPATIENT
Start: 2018-01-01 | End: 2018-01-01 | Stop reason: HOSPADM

## 2018-01-01 RX ORDER — ALBUTEROL SULFATE 90 UG/1
2 AEROSOL, METERED RESPIRATORY (INHALATION) 4 TIMES DAILY
Status: DISCONTINUED | OUTPATIENT
Start: 2018-01-01 | End: 2018-01-01 | Stop reason: HOSPADM

## 2018-01-01 RX ORDER — PSEUDOEPHEDRINE HCL 30 MG
100 TABLET ORAL 2 TIMES DAILY
Qty: 30 CAPSULE | Refills: 0
Start: 2018-01-01

## 2018-01-01 RX ORDER — METHYLPREDNISOLONE SODIUM SUCCINATE 125 MG/2ML
40 INJECTION, POWDER, LYOPHILIZED, FOR SOLUTION INTRAMUSCULAR; INTRAVENOUS ONCE
Status: COMPLETED | OUTPATIENT
Start: 2018-01-01 | End: 2018-01-01

## 2018-01-01 RX ORDER — ACETAMINOPHEN 80 MG
TABLET,CHEWABLE ORAL
Status: DISPENSED
Start: 2018-01-01 | End: 2018-01-01

## 2018-01-01 RX ORDER — BUDESONIDE 0.5 MG/2ML
500 INHALANT ORAL 2 TIMES DAILY
Status: DISCONTINUED | OUTPATIENT
Start: 2018-01-01 | End: 2018-01-01 | Stop reason: HOSPADM

## 2018-01-01 RX ORDER — MORPHINE SULFATE 20 MG/ML
2.5 SOLUTION ORAL ONCE
Status: DISCONTINUED | OUTPATIENT
Start: 2018-01-01 | End: 2018-01-01 | Stop reason: HOSPADM

## 2018-01-01 RX ORDER — MIDODRINE HYDROCHLORIDE 5 MG/1
5 TABLET ORAL
Status: DISCONTINUED | OUTPATIENT
Start: 2018-01-01 | End: 2018-01-01

## 2018-01-01 RX ORDER — FUROSEMIDE 10 MG/ML
20 INJECTION INTRAMUSCULAR; INTRAVENOUS 2 TIMES DAILY
Status: DISCONTINUED | OUTPATIENT
Start: 2018-01-01 | End: 2018-01-01

## 2018-01-01 RX ORDER — BISACODYL 10 MG
10 SUPPOSITORY, RECTAL RECTAL DAILY PRN
Status: DISCONTINUED | OUTPATIENT
Start: 2018-01-01 | End: 2018-01-01 | Stop reason: HOSPADM

## 2018-01-01 RX ORDER — SENNA PLUS 8.6 MG/1
1 TABLET ORAL 2 TIMES DAILY
Status: DISCONTINUED | OUTPATIENT
Start: 2018-01-01 | End: 2018-01-01 | Stop reason: HOSPADM

## 2018-01-01 RX ORDER — ASPIRIN 81 MG/1
81 TABLET, CHEWABLE ORAL DAILY
Status: DISCONTINUED | OUTPATIENT
Start: 2018-01-01 | End: 2018-01-01 | Stop reason: HOSPADM

## 2018-01-01 RX ORDER — SENNA PLUS 8.6 MG/1
1 TABLET ORAL ONCE
Status: DISCONTINUED | OUTPATIENT
Start: 2018-01-01 | End: 2018-01-01 | Stop reason: HOSPADM

## 2018-01-01 RX ORDER — PANTOPRAZOLE SODIUM 40 MG/1
40 TABLET, DELAYED RELEASE ORAL
Status: DISCONTINUED | OUTPATIENT
Start: 2018-01-01 | End: 2018-01-01 | Stop reason: HOSPADM

## 2018-01-01 RX ORDER — 0.9 % SODIUM CHLORIDE 0.9 %
500 INTRAVENOUS SOLUTION INTRAVENOUS ONCE
Status: COMPLETED | OUTPATIENT
Start: 2018-01-01 | End: 2018-01-01

## 2018-01-01 RX ORDER — MAGNESIUM SULFATE 1 G/100ML
1 INJECTION INTRAVENOUS ONCE
Status: COMPLETED | OUTPATIENT
Start: 2018-01-01 | End: 2018-01-01

## 2018-01-01 RX ORDER — ERGOCALCIFEROL 1.25 MG/1
50000 CAPSULE ORAL
Status: DISCONTINUED | OUTPATIENT
Start: 2018-01-01 | End: 2018-01-01 | Stop reason: HOSPADM

## 2018-01-01 RX ORDER — FLUCONAZOLE, SODIUM CHLORIDE 2 MG/ML
100 INJECTION INTRAVENOUS EVERY 24 HOURS
Status: DISCONTINUED | OUTPATIENT
Start: 2018-01-01 | End: 2018-01-01 | Stop reason: HOSPADM

## 2018-01-01 RX ORDER — TORSEMIDE 20 MG/1
20 TABLET ORAL 2 TIMES DAILY
Status: DISCONTINUED | OUTPATIENT
Start: 2018-01-01 | End: 2018-01-01 | Stop reason: HOSPADM

## 2018-01-01 RX ORDER — SODIUM CHLORIDE 9 MG/ML
INJECTION, SOLUTION INTRAVENOUS
Status: COMPLETED
Start: 2018-01-01 | End: 2018-01-01

## 2018-01-01 RX ORDER — MONTELUKAST SODIUM 10 MG/1
10 TABLET ORAL NIGHTLY
Status: DISCONTINUED | OUTPATIENT
Start: 2018-01-01 | End: 2018-01-01 | Stop reason: HOSPADM

## 2018-01-01 RX ORDER — IPRATROPIUM BROMIDE AND ALBUTEROL SULFATE 2.5; .5 MG/3ML; MG/3ML
1 SOLUTION RESPIRATORY (INHALATION) EVERY 4 HOURS PRN
Status: DISCONTINUED | OUTPATIENT
Start: 2018-01-01 | End: 2018-01-01 | Stop reason: HOSPADM

## 2018-01-01 RX ORDER — BISACODYL 10 MG
10 SUPPOSITORY, RECTAL RECTAL DAILY PRN
Qty: 30 SUPPOSITORY | Refills: 0
Start: 2018-01-01 | End: 2018-01-01

## 2018-01-01 RX ORDER — ACETAMINOPHEN 325 MG/1
650 TABLET ORAL EVERY 6 HOURS PRN
Status: DISCONTINUED | OUTPATIENT
Start: 2018-01-01 | End: 2018-01-01 | Stop reason: HOSPADM

## 2018-01-01 RX ORDER — IPRATROPIUM BROMIDE AND ALBUTEROL SULFATE 2.5; .5 MG/3ML; MG/3ML
1 SOLUTION RESPIRATORY (INHALATION) ONCE
Status: COMPLETED | OUTPATIENT
Start: 2018-01-01 | End: 2018-01-01

## 2018-01-01 RX ORDER — ONDANSETRON 2 MG/ML
4 INJECTION INTRAMUSCULAR; INTRAVENOUS EVERY 6 HOURS PRN
Status: DISCONTINUED | OUTPATIENT
Start: 2018-01-01 | End: 2018-01-01 | Stop reason: HOSPADM

## 2018-01-01 RX ORDER — M-VIT,TX,IRON,MINS/CALC/FOLIC 27MG-0.4MG
1 TABLET ORAL DAILY
Status: DISCONTINUED | OUTPATIENT
Start: 2018-01-01 | End: 2018-01-01 | Stop reason: HOSPADM

## 2018-01-01 RX ORDER — SODIUM CHLORIDE 0.9 % (FLUSH) 0.9 %
10 SYRINGE (ML) INJECTION EVERY 12 HOURS SCHEDULED
Status: DISCONTINUED | OUTPATIENT
Start: 2018-01-01 | End: 2018-01-01 | Stop reason: HOSPADM

## 2018-01-01 RX ORDER — MAGNESIUM SULFATE 4 G/50ML
4 INJECTION INTRAVENOUS ONCE
Status: COMPLETED | OUTPATIENT
Start: 2018-01-01 | End: 2018-01-01

## 2018-01-01 RX ORDER — MONTELUKAST SODIUM 10 MG/1
10 TABLET ORAL NIGHTLY
COMMUNITY

## 2018-01-01 RX ORDER — MIDODRINE HYDROCHLORIDE 5 MG/1
10 TABLET ORAL
Status: COMPLETED | OUTPATIENT
Start: 2018-01-01 | End: 2018-01-01

## 2018-01-01 RX ORDER — DOBUTAMINE HYDROCHLORIDE 200 MG/100ML
2.5 INJECTION INTRAVENOUS CONTINUOUS
Status: CANCELLED | OUTPATIENT
Start: 2018-01-01

## 2018-01-01 RX ORDER — SODIUM CHLORIDE 0.9 % (FLUSH) 0.9 %
10 SYRINGE (ML) INJECTION PRN
Status: DISCONTINUED | OUTPATIENT
Start: 2018-01-01 | End: 2018-01-01 | Stop reason: HOSPADM

## 2018-01-01 RX ORDER — ALBUMIN, HUMAN INJ 5% 5 %
12.5 SOLUTION INTRAVENOUS ONCE
Status: COMPLETED | OUTPATIENT
Start: 2018-01-01 | End: 2018-01-01

## 2018-01-01 RX ORDER — HEPARIN SODIUM 5000 [USP'U]/ML
5000 INJECTION, SOLUTION INTRAVENOUS; SUBCUTANEOUS EVERY 8 HOURS
Status: DISCONTINUED | OUTPATIENT
Start: 2018-01-01 | End: 2018-01-01 | Stop reason: HOSPADM

## 2018-01-01 RX ORDER — FUROSEMIDE 10 MG/ML
60 INJECTION INTRAMUSCULAR; INTRAVENOUS 3 TIMES DAILY
Status: DISCONTINUED | OUTPATIENT
Start: 2018-01-01 | End: 2018-01-01

## 2018-01-01 RX ORDER — IPRATROPIUM BROMIDE AND ALBUTEROL SULFATE 2.5; .5 MG/3ML; MG/3ML
1 SOLUTION RESPIRATORY (INHALATION) 4 TIMES DAILY
Status: DISCONTINUED | OUTPATIENT
Start: 2018-01-01 | End: 2018-01-01 | Stop reason: HOSPADM

## 2018-01-01 RX ORDER — FUROSEMIDE 10 MG/ML
40 INJECTION INTRAMUSCULAR; INTRAVENOUS ONCE
Status: COMPLETED | OUTPATIENT
Start: 2018-01-01 | End: 2018-01-01

## 2018-01-01 RX ORDER — MIDODRINE HYDROCHLORIDE 10 MG/1
10 TABLET ORAL
Qty: 90 TABLET | Refills: 0
Start: 2018-01-01

## 2018-01-01 RX ORDER — MORPHINE SULFATE 2 MG/ML
2 INJECTION, SOLUTION INTRAMUSCULAR; INTRAVENOUS ONCE
Status: COMPLETED | OUTPATIENT
Start: 2018-01-01 | End: 2018-01-01

## 2018-01-01 RX ORDER — ALBUMIN, HUMAN INJ 5% 5 %
25 SOLUTION INTRAVENOUS ONCE
Status: COMPLETED | OUTPATIENT
Start: 2018-01-01 | End: 2018-01-01

## 2018-01-01 RX ORDER — LANOLIN ALCOHOL/MO/W.PET/CERES
500 CREAM (GRAM) TOPICAL DAILY
Status: DISCONTINUED | OUTPATIENT
Start: 2018-01-01 | End: 2018-01-01 | Stop reason: HOSPADM

## 2018-01-01 RX ORDER — MAGNESIUM SULFATE 1 G/100ML
1 INJECTION INTRAVENOUS PRN
Status: DISCONTINUED | OUTPATIENT
Start: 2018-01-01 | End: 2018-01-01 | Stop reason: HOSPADM

## 2018-01-01 RX ADMIN — INSULIN LISPRO 1 UNITS: 100 INJECTION, SOLUTION INTRAVENOUS; SUBCUTANEOUS at 20:50

## 2018-01-01 RX ADMIN — ASPIRIN 81 MG 81 MG: 81 TABLET ORAL at 10:22

## 2018-01-01 RX ADMIN — HEPARIN SODIUM 5000 UNITS: 5000 INJECTION INTRAVENOUS; SUBCUTANEOUS at 14:20

## 2018-01-01 RX ADMIN — INSULIN LISPRO 2 UNITS: 100 INJECTION, SOLUTION INTRAVENOUS; SUBCUTANEOUS at 12:57

## 2018-01-01 RX ADMIN — MIDODRINE HYDROCHLORIDE 10 MG: 5 TABLET ORAL at 17:10

## 2018-01-01 RX ADMIN — NITROGLYCERIN 0.5 INCH: 20 OINTMENT TOPICAL at 06:03

## 2018-01-01 RX ADMIN — MIDODRINE HYDROCHLORIDE 10 MG: 5 TABLET ORAL at 18:08

## 2018-01-01 RX ADMIN — PANTOPRAZOLE SODIUM 40 MG: 40 TABLET, DELAYED RELEASE ORAL at 06:51

## 2018-01-01 RX ADMIN — DOCUSATE SODIUM 100 MG: 100 CAPSULE, LIQUID FILLED ORAL at 08:41

## 2018-01-01 RX ADMIN — SODIUM CHLORIDE, PRESERVATIVE FREE 10 ML: 5 INJECTION INTRAVENOUS at 09:43

## 2018-01-01 RX ADMIN — HEPARIN SODIUM 5000 UNITS: 5000 INJECTION INTRAVENOUS; SUBCUTANEOUS at 05:16

## 2018-01-01 RX ADMIN — INSULIN LISPRO 1 UNITS: 100 INJECTION, SOLUTION INTRAVENOUS; SUBCUTANEOUS at 21:14

## 2018-01-01 RX ADMIN — MIDODRINE HYDROCHLORIDE 10 MG: 5 TABLET ORAL at 12:48

## 2018-01-01 RX ADMIN — BUDESONIDE 500 MCG: 0.5 INHALANT RESPIRATORY (INHALATION) at 19:52

## 2018-01-01 RX ADMIN — MULTIPLE VITAMINS W/ MINERALS TAB 1 TABLET: TAB at 09:15

## 2018-01-01 RX ADMIN — ACETAMINOPHEN 650 MG: 325 TABLET, FILM COATED ORAL at 21:31

## 2018-01-01 RX ADMIN — BUDESONIDE 500 MCG: 0.5 INHALANT RESPIRATORY (INHALATION) at 20:08

## 2018-01-01 RX ADMIN — METOLAZONE 2.5 MG: 2.5 TABLET ORAL at 10:15

## 2018-01-01 RX ADMIN — METOPROLOL TARTRATE 12.5 MG: 25 TABLET ORAL at 21:58

## 2018-01-01 RX ADMIN — ASPIRIN 81 MG 81 MG: 81 TABLET ORAL at 08:41

## 2018-01-01 RX ADMIN — ALBUTEROL SULFATE 2 PUFF: 90 AEROSOL, METERED RESPIRATORY (INHALATION) at 08:12

## 2018-01-01 RX ADMIN — FERROUS SULFATE TAB 325 MG (65 MG ELEMENTAL FE) 325 MG: 325 (65 FE) TAB at 07:54

## 2018-01-01 RX ADMIN — PANTOPRAZOLE SODIUM 40 MG: 40 TABLET, DELAYED RELEASE ORAL at 05:41

## 2018-01-01 RX ADMIN — TORSEMIDE 40 MG: 20 TABLET ORAL at 10:22

## 2018-01-01 RX ADMIN — METOPROLOL TARTRATE 25 MG: 25 TABLET ORAL at 09:05

## 2018-01-01 RX ADMIN — MIDODRINE HYDROCHLORIDE 10 MG: 5 TABLET ORAL at 17:58

## 2018-01-01 RX ADMIN — FUROSEMIDE 60 MG: 10 INJECTION, SOLUTION INTRAMUSCULAR; INTRAVENOUS at 09:53

## 2018-01-01 RX ADMIN — SODIUM CHLORIDE, PRESERVATIVE FREE 10 ML: 5 INJECTION INTRAVENOUS at 09:24

## 2018-01-01 RX ADMIN — IPRATROPIUM BROMIDE AND ALBUTEROL SULFATE 1 AMPULE: .5; 3 SOLUTION RESPIRATORY (INHALATION) at 15:33

## 2018-01-01 RX ADMIN — MIDODRINE HYDROCHLORIDE 5 MG: 5 TABLET ORAL at 12:31

## 2018-01-01 RX ADMIN — SIMVASTATIN 20 MG: 20 TABLET, FILM COATED ORAL at 21:04

## 2018-01-01 RX ADMIN — METOPROLOL TARTRATE 12.5 MG: 25 TABLET ORAL at 21:04

## 2018-01-01 RX ADMIN — INSULIN LISPRO 1 UNITS: 100 INJECTION, SOLUTION INTRAVENOUS; SUBCUTANEOUS at 09:11

## 2018-01-01 RX ADMIN — MIDODRINE HYDROCHLORIDE 5 MG: 5 TABLET ORAL at 09:16

## 2018-01-01 RX ADMIN — FUROSEMIDE 20 MG: 10 INJECTION, SOLUTION INTRAVENOUS at 10:31

## 2018-01-01 RX ADMIN — MIDODRINE HYDROCHLORIDE 10 MG: 5 TABLET ORAL at 17:15

## 2018-01-01 RX ADMIN — INSULIN LISPRO 1 UNITS: 100 INJECTION, SOLUTION INTRAVENOUS; SUBCUTANEOUS at 19:32

## 2018-01-01 RX ADMIN — DOCUSATE SODIUM 100 MG: 100 CAPSULE, LIQUID FILLED ORAL at 10:36

## 2018-01-01 RX ADMIN — DOCUSATE SODIUM 100 MG: 100 CAPSULE, LIQUID FILLED ORAL at 21:03

## 2018-01-01 RX ADMIN — HEPARIN SODIUM 5000 UNITS: 5000 INJECTION, SOLUTION INTRAVENOUS; SUBCUTANEOUS at 22:32

## 2018-01-01 RX ADMIN — METOPROLOL TARTRATE 25 MG: 25 TABLET ORAL at 21:31

## 2018-01-01 RX ADMIN — SENNOSIDES 8.6 MG: 8.6 TABLET, FILM COATED ORAL at 10:23

## 2018-01-01 RX ADMIN — FUROSEMIDE 60 MG: 10 INJECTION, SOLUTION INTRAMUSCULAR; INTRAVENOUS at 11:28

## 2018-01-01 RX ADMIN — IPRATROPIUM BROMIDE AND ALBUTEROL SULFATE 1 AMPULE: .5; 3 SOLUTION RESPIRATORY (INHALATION) at 15:32

## 2018-01-01 RX ADMIN — MIDODRINE HYDROCHLORIDE 5 MG: 5 TABLET ORAL at 08:49

## 2018-01-01 RX ADMIN — DOCUSATE SODIUM 100 MG: 100 CAPSULE, LIQUID FILLED ORAL at 08:06

## 2018-01-01 RX ADMIN — ALBUTEROL SULFATE 2 PUFF: 90 AEROSOL, METERED RESPIRATORY (INHALATION) at 07:22

## 2018-01-01 RX ADMIN — FUROSEMIDE 60 MG: 10 INJECTION, SOLUTION INTRAMUSCULAR; INTRAVENOUS at 05:27

## 2018-01-01 RX ADMIN — SODIUM CHLORIDE, PRESERVATIVE FREE 10 ML: 5 INJECTION INTRAVENOUS at 21:05

## 2018-01-01 RX ADMIN — FLUCONAZOLE 100 MG: 2 INJECTION, SOLUTION INTRAVENOUS at 12:26

## 2018-01-01 RX ADMIN — MONTELUKAST SODIUM 10 MG: 10 TABLET, COATED ORAL at 22:01

## 2018-01-01 RX ADMIN — FUROSEMIDE 60 MG: 10 INJECTION, SOLUTION INTRAMUSCULAR; INTRAVENOUS at 03:14

## 2018-01-01 RX ADMIN — IPRATROPIUM BROMIDE AND ALBUTEROL SULFATE 1 AMPULE: .5; 3 SOLUTION RESPIRATORY (INHALATION) at 21:45

## 2018-01-01 RX ADMIN — SODIUM CHLORIDE, PRESERVATIVE FREE 10 ML: 5 INJECTION INTRAVENOUS at 08:06

## 2018-01-01 RX ADMIN — INSULIN LISPRO 1 UNITS: 100 INJECTION, SOLUTION INTRAVENOUS; SUBCUTANEOUS at 08:27

## 2018-01-01 RX ADMIN — FUROSEMIDE 5 MG/HR: 10 INJECTION, SOLUTION INTRAVENOUS at 04:27

## 2018-01-01 RX ADMIN — HEPARIN SODIUM 5000 UNITS: 5000 INJECTION INTRAVENOUS; SUBCUTANEOUS at 06:03

## 2018-01-01 RX ADMIN — ALBUMIN (HUMAN) 12.5 G: 0.25 INJECTION, SOLUTION INTRAVENOUS at 11:45

## 2018-01-01 RX ADMIN — ALBUTEROL SULFATE 2 PUFF: 90 AEROSOL, METERED RESPIRATORY (INHALATION) at 16:15

## 2018-01-01 RX ADMIN — INSULIN LISPRO 1 UNITS: 100 INJECTION, SOLUTION INTRAVENOUS; SUBCUTANEOUS at 09:19

## 2018-01-01 RX ADMIN — BUDESONIDE 500 MCG: 0.5 INHALANT RESPIRATORY (INHALATION) at 07:53

## 2018-01-01 RX ADMIN — MONTELUKAST SODIUM 10 MG: 10 TABLET, FILM COATED ORAL at 21:31

## 2018-01-01 RX ADMIN — BUDESONIDE 500 MCG: 0.5 INHALANT RESPIRATORY (INHALATION) at 20:41

## 2018-01-01 RX ADMIN — IPRATROPIUM BROMIDE AND ALBUTEROL SULFATE 1 AMPULE: .5; 3 SOLUTION RESPIRATORY (INHALATION) at 07:36

## 2018-01-01 RX ADMIN — BUDESONIDE 500 MCG: 0.5 INHALANT RESPIRATORY (INHALATION) at 21:31

## 2018-01-01 RX ADMIN — INSULIN LISPRO 1 UNITS: 100 INJECTION, SOLUTION INTRAVENOUS; SUBCUTANEOUS at 17:30

## 2018-01-01 RX ADMIN — FUROSEMIDE 60 MG: 10 INJECTION, SOLUTION INTRAMUSCULAR; INTRAVENOUS at 00:00

## 2018-01-01 RX ADMIN — DOCUSATE SODIUM 100 MG: 100 CAPSULE, LIQUID FILLED ORAL at 21:15

## 2018-01-01 RX ADMIN — SENNOSIDES 8.6 MG: 8.6 TABLET, FILM COATED ORAL at 08:56

## 2018-01-01 RX ADMIN — MAGNESIUM SULFATE IN WATER 2 G: 40 INJECTION, SOLUTION INTRAVENOUS at 10:39

## 2018-01-01 RX ADMIN — MULTIPLE VITAMINS W/ MINERALS TAB 1 TABLET: TAB at 08:59

## 2018-01-01 RX ADMIN — IPRATROPIUM BROMIDE AND ALBUTEROL SULFATE 1 AMPULE: .5; 3 SOLUTION RESPIRATORY (INHALATION) at 11:04

## 2018-01-01 RX ADMIN — HEPARIN SODIUM 5000 UNITS: 5000 INJECTION, SOLUTION INTRAVENOUS; SUBCUTANEOUS at 16:23

## 2018-01-01 RX ADMIN — INSULIN LISPRO 1 UNITS: 100 INJECTION, SOLUTION INTRAVENOUS; SUBCUTANEOUS at 22:41

## 2018-01-01 RX ADMIN — SIMVASTATIN 20 MG: 20 TABLET, FILM COATED ORAL at 22:34

## 2018-01-01 RX ADMIN — FERROUS SULFATE TAB 325 MG (65 MG ELEMENTAL FE) 325 MG: 325 (65 FE) TAB at 08:05

## 2018-01-01 RX ADMIN — BUDESONIDE 500 MCG: 0.5 INHALANT RESPIRATORY (INHALATION) at 07:38

## 2018-01-01 RX ADMIN — METOPROLOL TARTRATE 12.5 MG: 25 TABLET ORAL at 08:57

## 2018-01-01 RX ADMIN — MAGNESIUM SULFATE HEPTAHYDRATE 2 G: 40 INJECTION, SOLUTION INTRAVENOUS at 13:21

## 2018-01-01 RX ADMIN — ALBUTEROL SULFATE 2 PUFF: 90 AEROSOL, METERED RESPIRATORY (INHALATION) at 16:29

## 2018-01-01 RX ADMIN — INSULIN LISPRO 1 UNITS: 100 INJECTION, SOLUTION INTRAVENOUS; SUBCUTANEOUS at 18:15

## 2018-01-01 RX ADMIN — FLUCONAZOLE 100 MG: 2 INJECTION, SOLUTION INTRAVENOUS at 12:41

## 2018-01-01 RX ADMIN — IPRATROPIUM BROMIDE AND ALBUTEROL SULFATE 1 AMPULE: .5; 3 SOLUTION RESPIRATORY (INHALATION) at 08:36

## 2018-01-01 RX ADMIN — DOCUSATE SODIUM 100 MG: 100 CAPSULE, LIQUID FILLED ORAL at 21:16

## 2018-01-01 RX ADMIN — MAGNESIUM SULFATE HEPTAHYDRATE 1 G: 1 INJECTION, SOLUTION INTRAVENOUS at 10:25

## 2018-01-01 RX ADMIN — MONTELUKAST SODIUM 10 MG: 10 TABLET, COATED ORAL at 21:27

## 2018-01-01 RX ADMIN — FUROSEMIDE 60 MG: 10 INJECTION, SOLUTION INTRAMUSCULAR; INTRAVENOUS at 15:07

## 2018-01-01 RX ADMIN — BUDESONIDE 500 MCG: 0.5 INHALANT RESPIRATORY (INHALATION) at 08:47

## 2018-01-01 RX ADMIN — IPRATROPIUM BROMIDE AND ALBUTEROL SULFATE 1 AMPULE: .5; 3 SOLUTION RESPIRATORY (INHALATION) at 20:44

## 2018-01-01 RX ADMIN — TORSEMIDE 20 MG: 20 TABLET ORAL at 09:34

## 2018-01-01 RX ADMIN — MIDODRINE HYDROCHLORIDE 5 MG: 5 TABLET ORAL at 12:21

## 2018-01-01 RX ADMIN — MIDODRINE HYDROCHLORIDE 10 MG: 5 TABLET ORAL at 12:26

## 2018-01-01 RX ADMIN — ASPIRIN 81 MG CHEWABLE TABLET 81 MG: 81 TABLET CHEWABLE at 08:01

## 2018-01-01 RX ADMIN — ALBUTEROL SULFATE 2 PUFF: 90 AEROSOL, METERED RESPIRATORY (INHALATION) at 20:23

## 2018-01-01 RX ADMIN — MIDODRINE HYDROCHLORIDE 10 MG: 5 TABLET ORAL at 08:06

## 2018-01-01 RX ADMIN — ASPIRIN 81 MG CHEWABLE TABLET 81 MG: 81 TABLET CHEWABLE at 08:57

## 2018-01-01 RX ADMIN — METHYLPREDNISOLONE SODIUM SUCCINATE 40 MG: 125 INJECTION, POWDER, LYOPHILIZED, FOR SOLUTION INTRAMUSCULAR; INTRAVENOUS at 20:01

## 2018-01-01 RX ADMIN — IPRATROPIUM BROMIDE AND ALBUTEROL SULFATE 1 AMPULE: .5; 3 SOLUTION RESPIRATORY (INHALATION) at 16:25

## 2018-01-01 RX ADMIN — SODIUM CHLORIDE, PRESERVATIVE FREE 10 ML: 5 INJECTION INTRAVENOUS at 08:58

## 2018-01-01 RX ADMIN — METOLAZONE 2.5 MG: 2.5 TABLET ORAL at 07:54

## 2018-01-01 RX ADMIN — DOCUSATE SODIUM 100 MG: 100 CAPSULE, LIQUID FILLED ORAL at 21:27

## 2018-01-01 RX ADMIN — IPRATROPIUM BROMIDE AND ALBUTEROL SULFATE 1 AMPULE: .5; 3 SOLUTION RESPIRATORY (INHALATION) at 20:07

## 2018-01-01 RX ADMIN — PANTOPRAZOLE SODIUM 40 MG: 40 TABLET, DELAYED RELEASE ORAL at 06:30

## 2018-01-01 RX ADMIN — SIMVASTATIN 20 MG: 20 TABLET, FILM COATED ORAL at 21:27

## 2018-01-01 RX ADMIN — INSULIN LISPRO 2 UNITS: 100 INJECTION, SOLUTION INTRAVENOUS; SUBCUTANEOUS at 20:40

## 2018-01-01 RX ADMIN — HEPARIN SODIUM 2600 UNITS: 1000 INJECTION, SOLUTION INTRAVENOUS; SUBCUTANEOUS at 17:14

## 2018-01-01 RX ADMIN — IPRATROPIUM BROMIDE AND ALBUTEROL SULFATE 1 AMPULE: .5; 3 SOLUTION RESPIRATORY (INHALATION) at 11:53

## 2018-01-01 RX ADMIN — CYANOCOBALAMIN TAB 1000 MCG 500 MCG: 1000 TAB at 10:07

## 2018-01-01 RX ADMIN — MIDODRINE HYDROCHLORIDE 10 MG: 5 TABLET ORAL at 16:28

## 2018-01-01 RX ADMIN — MIDODRINE HYDROCHLORIDE 10 MG: 5 TABLET ORAL at 07:54

## 2018-01-01 RX ADMIN — HEPARIN SODIUM 5000 UNITS: 5000 INJECTION INTRAVENOUS; SUBCUTANEOUS at 06:18

## 2018-01-01 RX ADMIN — ACETAMINOPHEN 650 MG: 325 TABLET, FILM COATED ORAL at 19:30

## 2018-01-01 RX ADMIN — MULTIPLE VITAMINS W/ MINERALS TAB 1 TABLET: TAB at 10:08

## 2018-01-01 RX ADMIN — ALBUTEROL SULFATE 2 PUFF: 90 AEROSOL, METERED RESPIRATORY (INHALATION) at 08:40

## 2018-01-01 RX ADMIN — TORSEMIDE 20 MG: 20 TABLET ORAL at 08:57

## 2018-01-01 RX ADMIN — IPRATROPIUM BROMIDE AND ALBUTEROL SULFATE 1 AMPULE: .5; 3 SOLUTION RESPIRATORY (INHALATION) at 07:53

## 2018-01-01 RX ADMIN — SIMVASTATIN 20 MG: 20 TABLET, FILM COATED ORAL at 21:11

## 2018-01-01 RX ADMIN — ALBUMIN (HUMAN) 12.5 G: 12.5 SOLUTION INTRAVENOUS at 10:09

## 2018-01-01 RX ADMIN — HEPARIN SODIUM 2600 UNITS: 1000 INJECTION, SOLUTION INTRAVENOUS; SUBCUTANEOUS at 13:10

## 2018-01-01 RX ADMIN — MONTELUKAST SODIUM 10 MG: 10 TABLET, COATED ORAL at 21:15

## 2018-01-01 RX ADMIN — FERROUS SULFATE TAB 325 MG (65 MG ELEMENTAL FE) 325 MG: 325 (65 FE) TAB at 08:57

## 2018-01-01 RX ADMIN — INSULIN LISPRO 1 UNITS: 100 INJECTION, SOLUTION INTRAVENOUS; SUBCUTANEOUS at 22:35

## 2018-01-01 RX ADMIN — IPRATROPIUM BROMIDE AND ALBUTEROL SULFATE 1 AMPULE: .5; 3 SOLUTION RESPIRATORY (INHALATION) at 07:41

## 2018-01-01 RX ADMIN — MULTIPLE VITAMINS W/ MINERALS TAB 1 TABLET: TAB at 08:41

## 2018-01-01 RX ADMIN — SODIUM CHLORIDE, PRESERVATIVE FREE 10 ML: 5 INJECTION INTRAVENOUS at 08:07

## 2018-01-01 RX ADMIN — MONTELUKAST SODIUM 10 MG: 10 TABLET, FILM COATED ORAL at 22:31

## 2018-01-01 RX ADMIN — HEPARIN SODIUM 5000 UNITS: 5000 INJECTION INTRAVENOUS; SUBCUTANEOUS at 15:57

## 2018-01-01 RX ADMIN — ALBUTEROL SULFATE 2 PUFF: 90 AEROSOL, METERED RESPIRATORY (INHALATION) at 21:29

## 2018-01-01 RX ADMIN — HEPARIN SODIUM 5000 UNITS: 5000 INJECTION, SOLUTION INTRAVENOUS; SUBCUTANEOUS at 18:50

## 2018-01-01 RX ADMIN — DOCUSATE SODIUM 100 MG: 100 CAPSULE, LIQUID FILLED ORAL at 08:01

## 2018-01-01 RX ADMIN — MULTIPLE VITAMINS W/ MINERALS TAB 1 TABLET: TAB at 08:57

## 2018-01-01 RX ADMIN — MIDODRINE HYDROCHLORIDE 10 MG: 5 TABLET ORAL at 10:08

## 2018-01-01 RX ADMIN — MONTELUKAST SODIUM 10 MG: 10 TABLET, FILM COATED ORAL at 20:48

## 2018-01-01 RX ADMIN — DOCUSATE SODIUM 100 MG: 100 CAPSULE, LIQUID FILLED ORAL at 10:44

## 2018-01-01 RX ADMIN — ALBUTEROL SULFATE 2 PUFF: 90 AEROSOL, METERED RESPIRATORY (INHALATION) at 21:47

## 2018-01-01 RX ADMIN — ALBUTEROL SULFATE 2 PUFF: 90 AEROSOL, METERED RESPIRATORY (INHALATION) at 20:14

## 2018-01-01 RX ADMIN — MIDODRINE HYDROCHLORIDE 10 MG: 5 TABLET ORAL at 11:21

## 2018-01-01 RX ADMIN — MIDODRINE HYDROCHLORIDE 5 MG: 5 TABLET ORAL at 17:08

## 2018-01-01 RX ADMIN — DOCUSATE SODIUM 100 MG: 100 CAPSULE, LIQUID FILLED ORAL at 08:57

## 2018-01-01 RX ADMIN — HEPARIN SODIUM 5000 UNITS: 5000 INJECTION, SOLUTION INTRAVENOUS; SUBCUTANEOUS at 06:04

## 2018-01-01 RX ADMIN — ASPIRIN 81 MG 81 MG: 81 TABLET ORAL at 08:55

## 2018-01-01 RX ADMIN — MIDODRINE HYDROCHLORIDE 10 MG: 5 TABLET ORAL at 18:04

## 2018-01-01 RX ADMIN — METOPROLOL TARTRATE 25 MG: 25 TABLET ORAL at 22:02

## 2018-01-01 RX ADMIN — FUROSEMIDE 40 MG: 10 INJECTION, SOLUTION INTRAVENOUS at 21:24

## 2018-01-01 RX ADMIN — SODIUM CHLORIDE, PRESERVATIVE FREE 10 ML: 5 INJECTION INTRAVENOUS at 10:12

## 2018-01-01 RX ADMIN — MIDODRINE HYDROCHLORIDE 10 MG: 5 TABLET ORAL at 10:22

## 2018-01-01 RX ADMIN — ALBUTEROL SULFATE 2 PUFF: 90 AEROSOL, METERED RESPIRATORY (INHALATION) at 16:43

## 2018-01-01 RX ADMIN — CYANOCOBALAMIN TAB 1000 MCG 500 MCG: 1000 TAB at 08:06

## 2018-01-01 RX ADMIN — MIDODRINE HYDROCHLORIDE 10 MG: 5 TABLET ORAL at 13:08

## 2018-01-01 RX ADMIN — BUDESONIDE 500 MCG: 0.5 INHALANT RESPIRATORY (INHALATION) at 07:35

## 2018-01-01 RX ADMIN — DOCUSATE SODIUM 100 MG: 100 CAPSULE, LIQUID FILLED ORAL at 20:48

## 2018-01-01 RX ADMIN — FUROSEMIDE 60 MG: 10 INJECTION, SOLUTION INTRAVENOUS at 18:47

## 2018-01-01 RX ADMIN — ALBUTEROL SULFATE 2 PUFF: 90 AEROSOL, METERED RESPIRATORY (INHALATION) at 11:07

## 2018-01-01 RX ADMIN — INSULIN LISPRO 1 UNITS: 100 INJECTION, SOLUTION INTRAVENOUS; SUBCUTANEOUS at 17:22

## 2018-01-01 RX ADMIN — ALBUTEROL SULFATE 2 PUFF: 90 AEROSOL, METERED RESPIRATORY (INHALATION) at 11:45

## 2018-01-01 RX ADMIN — FUROSEMIDE 60 MG: 10 INJECTION, SOLUTION INTRAMUSCULAR; INTRAVENOUS at 20:47

## 2018-01-01 RX ADMIN — FERROUS SULFATE TAB 325 MG (65 MG ELEMENTAL FE) 325 MG: 325 (65 FE) TAB at 08:56

## 2018-01-01 RX ADMIN — BUDESONIDE 500 MCG: 0.5 INHALANT RESPIRATORY (INHALATION) at 21:45

## 2018-01-01 RX ADMIN — PANTOPRAZOLE SODIUM 40 MG: 40 TABLET, DELAYED RELEASE ORAL at 05:27

## 2018-01-01 RX ADMIN — SODIUM CHLORIDE, PRESERVATIVE FREE 10 ML: 5 INJECTION INTRAVENOUS at 21:19

## 2018-01-01 RX ADMIN — HEPARIN SODIUM 5000 UNITS: 5000 INJECTION INTRAVENOUS; SUBCUTANEOUS at 21:06

## 2018-01-01 RX ADMIN — TORSEMIDE 20 MG: 20 TABLET ORAL at 08:41

## 2018-01-01 RX ADMIN — SIMVASTATIN 20 MG: 20 TABLET, FILM COATED ORAL at 21:05

## 2018-01-01 RX ADMIN — BISACODYL 10 MG: 10 SUPPOSITORY RECTAL at 10:25

## 2018-01-01 RX ADMIN — MORPHINE SULFATE 2 MG: 2 INJECTION, SOLUTION INTRAMUSCULAR; INTRAVENOUS at 02:14

## 2018-01-01 RX ADMIN — ALBUTEROL SULFATE 2 PUFF: 90 AEROSOL, METERED RESPIRATORY (INHALATION) at 08:47

## 2018-01-01 RX ADMIN — SODIUM CHLORIDE, PRESERVATIVE FREE 10 ML: 5 INJECTION INTRAVENOUS at 21:27

## 2018-01-01 RX ADMIN — MULTIPLE VITAMINS W/ MINERALS TAB 1 TABLET: TAB at 09:34

## 2018-01-01 RX ADMIN — MIDODRINE HYDROCHLORIDE 10 MG: 5 TABLET ORAL at 11:39

## 2018-01-01 RX ADMIN — BUDESONIDE 500 MCG: 0.5 INHALANT RESPIRATORY (INHALATION) at 07:27

## 2018-01-01 RX ADMIN — ASPIRIN 81 MG CHEWABLE TABLET 81 MG: 81 TABLET CHEWABLE at 09:34

## 2018-01-01 RX ADMIN — MIDODRINE HYDROCHLORIDE 10 MG: 5 TABLET ORAL at 16:33

## 2018-01-01 RX ADMIN — SIMVASTATIN 20 MG: 20 TABLET, FILM COATED ORAL at 21:31

## 2018-01-01 RX ADMIN — MONTELUKAST SODIUM 10 MG: 10 TABLET, COATED ORAL at 21:11

## 2018-01-01 RX ADMIN — SENNOSIDES 8.6 MG: 8.6 TABLET, FILM COATED ORAL at 21:31

## 2018-01-01 RX ADMIN — MIDODRINE HYDROCHLORIDE 10 MG: 5 TABLET ORAL at 08:40

## 2018-01-01 RX ADMIN — SIMVASTATIN 20 MG: 20 TABLET, FILM COATED ORAL at 21:16

## 2018-01-01 RX ADMIN — DOCUSATE SODIUM 100 MG: 100 CAPSULE, LIQUID FILLED ORAL at 22:25

## 2018-01-01 RX ADMIN — PANTOPRAZOLE SODIUM 40 MG: 40 TABLET, DELAYED RELEASE ORAL at 06:27

## 2018-01-01 RX ADMIN — FUROSEMIDE 60 MG: 10 INJECTION, SOLUTION INTRAMUSCULAR; INTRAVENOUS at 22:48

## 2018-01-01 RX ADMIN — IPRATROPIUM BROMIDE AND ALBUTEROL SULFATE 1 AMPULE: .5; 3 SOLUTION RESPIRATORY (INHALATION) at 08:57

## 2018-01-01 RX ADMIN — MONTELUKAST SODIUM 10 MG: 10 TABLET, COATED ORAL at 21:16

## 2018-01-01 RX ADMIN — MIDODRINE HYDROCHLORIDE 10 MG: 5 TABLET ORAL at 08:58

## 2018-01-01 RX ADMIN — IPRATROPIUM BROMIDE AND ALBUTEROL SULFATE 1 AMPULE: .5; 3 SOLUTION RESPIRATORY (INHALATION) at 12:05

## 2018-01-01 RX ADMIN — HEPARIN SODIUM 5000 UNITS: 5000 INJECTION INTRAVENOUS; SUBCUTANEOUS at 06:51

## 2018-01-01 RX ADMIN — SENNOSIDES 8.6 MG: 8.6 TABLET, FILM COATED ORAL at 22:25

## 2018-01-01 RX ADMIN — HEPARIN SODIUM 5000 UNITS: 5000 INJECTION INTRAVENOUS; SUBCUTANEOUS at 21:14

## 2018-01-01 RX ADMIN — ALBUTEROL SULFATE 2 PUFF: 90 AEROSOL, METERED RESPIRATORY (INHALATION) at 21:52

## 2018-01-01 RX ADMIN — PANTOPRAZOLE SODIUM 40 MG: 40 TABLET, DELAYED RELEASE ORAL at 06:26

## 2018-01-01 RX ADMIN — ASPIRIN 81 MG CHEWABLE TABLET 81 MG: 81 TABLET CHEWABLE at 10:07

## 2018-01-01 RX ADMIN — ASPIRIN 81 MG 81 MG: 81 TABLET ORAL at 09:43

## 2018-01-01 RX ADMIN — DOCUSATE SODIUM 100 MG: 100 CAPSULE, LIQUID FILLED ORAL at 21:05

## 2018-01-01 RX ADMIN — SODIUM CHLORIDE, PRESERVATIVE FREE 10 ML: 5 INJECTION INTRAVENOUS at 22:04

## 2018-01-01 RX ADMIN — BUDESONIDE 500 MCG: 0.5 INHALANT RESPIRATORY (INHALATION) at 08:12

## 2018-01-01 RX ADMIN — HEPARIN SODIUM 5000 UNITS: 5000 INJECTION, SOLUTION INTRAVENOUS; SUBCUTANEOUS at 00:02

## 2018-01-01 RX ADMIN — MONTELUKAST SODIUM 10 MG: 10 TABLET, COATED ORAL at 21:31

## 2018-01-01 RX ADMIN — METOLAZONE 2.5 MG: 2.5 TABLET ORAL at 08:59

## 2018-01-01 RX ADMIN — Medication 400 MG: at 10:35

## 2018-01-01 RX ADMIN — PANTOPRAZOLE SODIUM 40 MG: 40 TABLET, DELAYED RELEASE ORAL at 05:16

## 2018-01-01 RX ADMIN — FERROUS SULFATE TAB 325 MG (65 MG ELEMENTAL FE) 325 MG: 325 (65 FE) TAB at 08:41

## 2018-01-01 RX ADMIN — TORSEMIDE 20 MG: 20 TABLET ORAL at 22:03

## 2018-01-01 RX ADMIN — MIDODRINE HYDROCHLORIDE 10 MG: 5 TABLET ORAL at 17:13

## 2018-01-01 RX ADMIN — MIDODRINE HYDROCHLORIDE 10 MG: 5 TABLET ORAL at 12:37

## 2018-01-01 RX ADMIN — BUDESONIDE 500 MCG: 0.5 INHALANT RESPIRATORY (INHALATION) at 21:49

## 2018-01-01 RX ADMIN — SODIUM CHLORIDE, PRESERVATIVE FREE 10 ML: 5 INJECTION INTRAVENOUS at 21:30

## 2018-01-01 RX ADMIN — MIDODRINE HYDROCHLORIDE 10 MG: 5 TABLET ORAL at 12:07

## 2018-01-01 RX ADMIN — TORSEMIDE 20 MG: 20 TABLET ORAL at 08:06

## 2018-01-01 RX ADMIN — DOCUSATE SODIUM 100 MG: 100 CAPSULE, LIQUID FILLED ORAL at 23:55

## 2018-01-01 RX ADMIN — DOCUSATE SODIUM 100 MG: 100 CAPSULE, LIQUID FILLED ORAL at 20:35

## 2018-01-01 RX ADMIN — SODIUM CHLORIDE 500 ML: 9 INJECTION, SOLUTION INTRAVENOUS at 05:53

## 2018-01-01 RX ADMIN — INSULIN LISPRO 1 UNITS: 100 INJECTION, SOLUTION INTRAVENOUS; SUBCUTANEOUS at 22:25

## 2018-01-01 RX ADMIN — BUDESONIDE 500 MCG: 0.5 INHALANT RESPIRATORY (INHALATION) at 08:58

## 2018-01-01 RX ADMIN — FUROSEMIDE 60 MG: 10 INJECTION, SOLUTION INTRAMUSCULAR; INTRAVENOUS at 06:32

## 2018-01-01 RX ADMIN — ALBUMIN (HUMAN) 25 G: 0.25 INJECTION, SOLUTION INTRAVENOUS at 14:38

## 2018-01-01 RX ADMIN — MONTELUKAST SODIUM 10 MG: 10 TABLET, FILM COATED ORAL at 23:56

## 2018-01-01 RX ADMIN — BUDESONIDE 500 MCG: 0.5 INHALANT RESPIRATORY (INHALATION) at 22:24

## 2018-01-01 RX ADMIN — ASPIRIN 81 MG CHEWABLE TABLET 81 MG: 81 TABLET CHEWABLE at 08:56

## 2018-01-01 RX ADMIN — DOCUSATE SODIUM 100 MG: 100 CAPSULE, LIQUID FILLED ORAL at 22:01

## 2018-01-01 RX ADMIN — SODIUM CHLORIDE, PRESERVATIVE FREE 10 ML: 5 INJECTION INTRAVENOUS at 08:22

## 2018-01-01 RX ADMIN — PANTOPRAZOLE SODIUM 40 MG: 40 TABLET, DELAYED RELEASE ORAL at 10:22

## 2018-01-01 RX ADMIN — SENNOSIDES 8.6 MG: 8.6 TABLET, FILM COATED ORAL at 10:35

## 2018-01-01 RX ADMIN — SODIUM CHLORIDE, PRESERVATIVE FREE 10 ML: 5 INJECTION INTRAVENOUS at 20:36

## 2018-01-01 RX ADMIN — MIDODRINE HYDROCHLORIDE 10 MG: 5 TABLET ORAL at 14:02

## 2018-01-01 RX ADMIN — ACETAMINOPHEN 650 MG: 325 TABLET, FILM COATED ORAL at 10:43

## 2018-01-01 RX ADMIN — HEPARIN SODIUM 5000 UNITS: 5000 INJECTION INTRAVENOUS; SUBCUTANEOUS at 14:17

## 2018-01-01 RX ADMIN — CYANOCOBALAMIN TAB 1000 MCG 500 MCG: 1000 TAB at 09:18

## 2018-01-01 RX ADMIN — HEPARIN SODIUM 5000 UNITS: 5000 INJECTION INTRAVENOUS; SUBCUTANEOUS at 21:17

## 2018-01-01 RX ADMIN — IPRATROPIUM BROMIDE AND ALBUTEROL SULFATE 1 AMPULE: .5; 3 SOLUTION RESPIRATORY (INHALATION) at 07:34

## 2018-01-01 RX ADMIN — INSULIN LISPRO 1 UNITS: 100 INJECTION, SOLUTION INTRAVENOUS; SUBCUTANEOUS at 22:07

## 2018-01-01 RX ADMIN — IPRATROPIUM BROMIDE AND ALBUTEROL SULFATE 1 AMPULE: .5; 3 SOLUTION RESPIRATORY (INHALATION) at 18:41

## 2018-01-01 RX ADMIN — IPRATROPIUM BROMIDE AND ALBUTEROL SULFATE 1 AMPULE: .5; 3 SOLUTION RESPIRATORY (INHALATION) at 07:25

## 2018-01-01 RX ADMIN — FUROSEMIDE 60 MG: 10 INJECTION, SOLUTION INTRAMUSCULAR; INTRAVENOUS at 10:40

## 2018-01-01 RX ADMIN — INSULIN LISPRO 1 UNITS: 100 INJECTION, SOLUTION INTRAVENOUS; SUBCUTANEOUS at 18:06

## 2018-01-01 RX ADMIN — FERROUS SULFATE TAB 325 MG (65 MG ELEMENTAL FE) 325 MG: 325 (65 FE) TAB at 09:34

## 2018-01-01 RX ADMIN — MIDODRINE HYDROCHLORIDE 10 MG: 5 TABLET ORAL at 08:57

## 2018-01-01 RX ADMIN — FUROSEMIDE 60 MG: 10 INJECTION, SOLUTION INTRAMUSCULAR; INTRAVENOUS at 02:58

## 2018-01-01 RX ADMIN — TORSEMIDE 20 MG: 20 TABLET ORAL at 21:06

## 2018-01-01 RX ADMIN — ALBUTEROL SULFATE 2 PUFF: 90 AEROSOL, METERED RESPIRATORY (INHALATION) at 17:13

## 2018-01-01 RX ADMIN — FERROUS SULFATE TAB 325 MG (65 MG ELEMENTAL FE) 325 MG: 325 (65 FE) TAB at 08:01

## 2018-01-01 RX ADMIN — SODIUM CHLORIDE: 9 INJECTION, SOLUTION INTRAVENOUS at 06:55

## 2018-01-01 RX ADMIN — INSULIN LISPRO 1 UNITS: 100 INJECTION, SOLUTION INTRAVENOUS; SUBCUTANEOUS at 10:03

## 2018-01-01 RX ADMIN — MIDODRINE HYDROCHLORIDE 10 MG: 5 TABLET ORAL at 18:07

## 2018-01-01 RX ADMIN — HEPARIN SODIUM 5000 UNITS: 5000 INJECTION INTRAVENOUS; SUBCUTANEOUS at 14:52

## 2018-01-01 RX ADMIN — MULTIPLE VITAMINS W/ MINERALS TAB 1 TABLET: TAB at 08:01

## 2018-01-01 RX ADMIN — IPRATROPIUM BROMIDE AND ALBUTEROL SULFATE 1 AMPULE: .5; 3 SOLUTION RESPIRATORY (INHALATION) at 20:41

## 2018-01-01 RX ADMIN — INSULIN LISPRO 1 UNITS: 100 INJECTION, SOLUTION INTRAVENOUS; SUBCUTANEOUS at 09:40

## 2018-01-01 RX ADMIN — IPRATROPIUM BROMIDE AND ALBUTEROL SULFATE 1 AMPULE: .5; 3 SOLUTION RESPIRATORY (INHALATION) at 07:56

## 2018-01-01 RX ADMIN — INSULIN LISPRO 1 UNITS: 100 INJECTION, SOLUTION INTRAVENOUS; SUBCUTANEOUS at 12:35

## 2018-01-01 RX ADMIN — HEPARIN SODIUM 5000 UNITS: 5000 INJECTION INTRAVENOUS; SUBCUTANEOUS at 21:12

## 2018-01-01 RX ADMIN — TORSEMIDE 20 MG: 20 TABLET ORAL at 21:15

## 2018-01-01 RX ADMIN — DOCUSATE SODIUM 100 MG: 100 CAPSULE, LIQUID FILLED ORAL at 09:43

## 2018-01-01 RX ADMIN — INSULIN LISPRO 2 UNITS: 100 INJECTION, SOLUTION INTRAVENOUS; SUBCUTANEOUS at 12:37

## 2018-01-01 RX ADMIN — ERGOCALCIFEROL 50000 UNITS: 1.25 CAPSULE ORAL at 12:37

## 2018-01-01 RX ADMIN — PANTOPRAZOLE SODIUM 40 MG: 40 TABLET, DELAYED RELEASE ORAL at 06:03

## 2018-01-01 RX ADMIN — IPRATROPIUM BROMIDE AND ALBUTEROL SULFATE 1 AMPULE: .5; 3 SOLUTION RESPIRATORY (INHALATION) at 16:32

## 2018-01-01 RX ADMIN — DOCUSATE SODIUM 100 MG: 100 CAPSULE, LIQUID FILLED ORAL at 22:32

## 2018-01-01 RX ADMIN — SIMVASTATIN 20 MG: 20 TABLET, FILM COATED ORAL at 22:24

## 2018-01-01 RX ADMIN — ALBUTEROL SULFATE 2 PUFF: 90 AEROSOL, METERED RESPIRATORY (INHALATION) at 12:06

## 2018-01-01 RX ADMIN — BUDESONIDE 500 MCG: 0.5 INHALANT RESPIRATORY (INHALATION) at 21:53

## 2018-01-01 RX ADMIN — INSULIN LISPRO 1 UNITS: 100 INJECTION, SOLUTION INTRAVENOUS; SUBCUTANEOUS at 11:40

## 2018-01-01 RX ADMIN — DOCUSATE SODIUM 100 MG: 100 CAPSULE, LIQUID FILLED ORAL at 08:56

## 2018-01-01 RX ADMIN — PANTOPRAZOLE SODIUM 40 MG: 40 TABLET, DELAYED RELEASE ORAL at 06:18

## 2018-01-01 RX ADMIN — CYANOCOBALAMIN TAB 1000 MCG 500 MCG: 1000 TAB at 08:01

## 2018-01-01 RX ADMIN — MONTELUKAST SODIUM 10 MG: 10 TABLET, COATED ORAL at 21:05

## 2018-01-01 RX ADMIN — FUROSEMIDE 60 MG: 10 INJECTION, SOLUTION INTRAMUSCULAR; INTRAVENOUS at 10:36

## 2018-01-01 RX ADMIN — IPRATROPIUM BROMIDE AND ALBUTEROL SULFATE 1 AMPULE: .5; 3 SOLUTION RESPIRATORY (INHALATION) at 11:29

## 2018-01-01 RX ADMIN — ALBUTEROL SULFATE 2 PUFF: 90 AEROSOL, METERED RESPIRATORY (INHALATION) at 07:46

## 2018-01-01 RX ADMIN — BUDESONIDE 500 MCG: 0.5 INHALANT RESPIRATORY (INHALATION) at 20:20

## 2018-01-01 RX ADMIN — ASPIRIN 81 MG CHEWABLE TABLET 81 MG: 81 TABLET CHEWABLE at 08:05

## 2018-01-01 RX ADMIN — HEPARIN SODIUM 5000 UNITS: 5000 INJECTION, SOLUTION INTRAVENOUS; SUBCUTANEOUS at 15:07

## 2018-01-01 RX ADMIN — PANTOPRAZOLE SODIUM 40 MG: 40 TABLET, DELAYED RELEASE ORAL at 05:45

## 2018-01-01 RX ADMIN — INSULIN LISPRO 1 UNITS: 100 INJECTION, SOLUTION INTRAVENOUS; SUBCUTANEOUS at 08:40

## 2018-01-01 RX ADMIN — INSULIN LISPRO 1 UNITS: 100 INJECTION, SOLUTION INTRAVENOUS; SUBCUTANEOUS at 02:13

## 2018-01-01 RX ADMIN — MIDODRINE HYDROCHLORIDE 10 MG: 5 TABLET ORAL at 16:17

## 2018-01-01 RX ADMIN — INSULIN LISPRO 1 UNITS: 100 INJECTION, SOLUTION INTRAVENOUS; SUBCUTANEOUS at 17:25

## 2018-01-01 RX ADMIN — BUDESONIDE 500 MCG: 0.5 INHALANT RESPIRATORY (INHALATION) at 08:31

## 2018-01-01 RX ADMIN — DOCUSATE SODIUM 100 MG: 100 CAPSULE, LIQUID FILLED ORAL at 10:06

## 2018-01-01 RX ADMIN — DOCUSATE SODIUM 100 MG: 100 CAPSULE, LIQUID FILLED ORAL at 07:55

## 2018-01-01 RX ADMIN — SIMVASTATIN 20 MG: 20 TABLET, FILM COATED ORAL at 20:48

## 2018-01-01 RX ADMIN — INSULIN LISPRO 2 UNITS: 100 INJECTION, SOLUTION INTRAVENOUS; SUBCUTANEOUS at 17:06

## 2018-01-01 RX ADMIN — ALBUTEROL SULFATE 2 PUFF: 90 AEROSOL, METERED RESPIRATORY (INHALATION) at 16:14

## 2018-01-01 RX ADMIN — IPRATROPIUM BROMIDE AND ALBUTEROL SULFATE 1 AMPULE: .5; 3 SOLUTION RESPIRATORY (INHALATION) at 07:21

## 2018-01-01 RX ADMIN — MIDODRINE HYDROCHLORIDE 10 MG: 5 TABLET ORAL at 12:15

## 2018-01-01 RX ADMIN — MIDODRINE HYDROCHLORIDE 5 MG: 5 TABLET ORAL at 14:38

## 2018-01-01 RX ADMIN — SENNOSIDES 8.6 MG: 8.6 TABLET, FILM COATED ORAL at 08:41

## 2018-01-01 RX ADMIN — FLUCONAZOLE 100 MG: 2 INJECTION, SOLUTION INTRAVENOUS at 15:07

## 2018-01-01 RX ADMIN — SIMVASTATIN 20 MG: 20 TABLET, FILM COATED ORAL at 23:56

## 2018-01-01 RX ADMIN — DOCUSATE SODIUM 100 MG: 100 CAPSULE, LIQUID FILLED ORAL at 08:59

## 2018-01-01 RX ADMIN — DOPAMINE HYDROCHLORIDE IN DEXTROSE 2.5 MCG/KG/MIN: 1.6 INJECTION, SOLUTION INTRAVENOUS at 00:00

## 2018-01-01 RX ADMIN — BUDESONIDE 500 MCG: 0.5 INHALANT RESPIRATORY (INHALATION) at 21:25

## 2018-01-01 RX ADMIN — METOLAZONE 2.5 MG: 2.5 TABLET ORAL at 08:57

## 2018-01-01 RX ADMIN — FUROSEMIDE 20 MG: 10 INJECTION, SOLUTION INTRAVENOUS at 17:57

## 2018-01-01 RX ADMIN — NITROGLYCERIN 0.5 INCH: 20 OINTMENT TOPICAL at 01:03

## 2018-01-01 RX ADMIN — FUROSEMIDE 60 MG: 10 INJECTION, SOLUTION INTRAVENOUS at 12:23

## 2018-01-01 RX ADMIN — CYANOCOBALAMIN TAB 1000 MCG 500 MCG: 1000 TAB at 08:41

## 2018-01-01 RX ADMIN — BUDESONIDE 500 MCG: 0.5 INHALANT RESPIRATORY (INHALATION) at 07:48

## 2018-01-01 RX ADMIN — FERROUS SULFATE TAB 325 MG (65 MG ELEMENTAL FE) 325 MG: 325 (65 FE) TAB at 09:18

## 2018-01-01 RX ADMIN — DOBUTAMINE HYDROCHLORIDE 5 MCG/KG/MIN: 200 INJECTION INTRAVENOUS at 20:01

## 2018-01-01 RX ADMIN — MONTELUKAST SODIUM 10 MG: 10 TABLET, FILM COATED ORAL at 21:04

## 2018-01-01 RX ADMIN — NITROGLYCERIN 0.5 INCH: 20 OINTMENT TOPICAL at 17:49

## 2018-01-01 RX ADMIN — IPRATROPIUM BROMIDE AND ALBUTEROL SULFATE 1 AMPULE: .5; 3 SOLUTION RESPIRATORY (INHALATION) at 19:52

## 2018-01-01 RX ADMIN — INSULIN LISPRO 1 UNITS: 100 INJECTION, SOLUTION INTRAVENOUS; SUBCUTANEOUS at 00:01

## 2018-01-01 RX ADMIN — DOCUSATE SODIUM 100 MG: 100 CAPSULE, LIQUID FILLED ORAL at 22:34

## 2018-01-01 RX ADMIN — Medication 400 MG: at 21:31

## 2018-01-01 RX ADMIN — MONTELUKAST SODIUM 10 MG: 10 TABLET, FILM COATED ORAL at 22:24

## 2018-01-01 RX ADMIN — SIMVASTATIN 20 MG: 20 TABLET, FILM COATED ORAL at 22:01

## 2018-01-01 RX ADMIN — INSULIN LISPRO 3 UNITS: 100 INJECTION, SOLUTION INTRAVENOUS; SUBCUTANEOUS at 12:58

## 2018-01-01 RX ADMIN — BUDESONIDE 500 MCG: 0.5 INHALANT RESPIRATORY (INHALATION) at 07:43

## 2018-01-01 RX ADMIN — MAGNESIUM SULFATE HEPTAHYDRATE 4 G: 80 INJECTION, SOLUTION INTRAVENOUS at 14:26

## 2018-01-01 RX ADMIN — SENNOSIDES 8.6 MG: 8.6 TABLET, FILM COATED ORAL at 22:31

## 2018-01-01 RX ADMIN — ACETAMINOPHEN 650 MG: 325 TABLET, FILM COATED ORAL at 03:11

## 2018-01-01 RX ADMIN — ASPIRIN 81 MG 81 MG: 81 TABLET ORAL at 12:29

## 2018-01-01 RX ADMIN — ALBUTEROL SULFATE 2 PUFF: 90 AEROSOL, METERED RESPIRATORY (INHALATION) at 11:51

## 2018-01-01 RX ADMIN — MIDODRINE HYDROCHLORIDE 10 MG: 5 TABLET ORAL at 10:44

## 2018-01-01 RX ADMIN — ACETAMINOPHEN 650 MG: 325 TABLET, FILM COATED ORAL at 02:58

## 2018-01-01 RX ADMIN — BUDESONIDE 500 MCG: 0.5 INHALANT RESPIRATORY (INHALATION) at 08:40

## 2018-01-01 RX ADMIN — SIMVASTATIN 20 MG: 20 TABLET, FILM COATED ORAL at 22:32

## 2018-01-01 RX ADMIN — BUDESONIDE 500 MCG: 0.5 INHALANT RESPIRATORY (INHALATION) at 21:32

## 2018-01-01 RX ADMIN — ALBUMIN (HUMAN) 25 G: 12.5 INJECTION, SOLUTION INTRAVENOUS at 08:49

## 2018-01-01 RX ADMIN — IPRATROPIUM BROMIDE AND ALBUTEROL SULFATE 1 AMPULE: .5; 3 SOLUTION RESPIRATORY (INHALATION) at 21:31

## 2018-01-01 RX ADMIN — Medication 400 MG: at 10:23

## 2018-01-01 RX ADMIN — FUROSEMIDE 5 MG/HR: 10 INJECTION, SOLUTION INTRAVENOUS at 21:17

## 2018-01-01 RX ADMIN — HEPARIN SODIUM 5000 UNITS: 5000 INJECTION INTRAVENOUS; SUBCUTANEOUS at 21:31

## 2018-01-01 RX ADMIN — FUROSEMIDE 20 MG: 10 INJECTION, SOLUTION INTRAVENOUS at 08:01

## 2018-01-01 RX ADMIN — METOPROLOL TARTRATE 12.5 MG: 25 TABLET ORAL at 10:34

## 2018-01-01 RX ADMIN — FERROUS SULFATE TAB 325 MG (65 MG ELEMENTAL FE) 325 MG: 325 (65 FE) TAB at 10:07

## 2018-01-01 RX ADMIN — MULTIPLE VITAMINS W/ MINERALS TAB 1 TABLET: TAB at 07:54

## 2018-01-01 RX ADMIN — CYANOCOBALAMIN TAB 1000 MCG 500 MCG: 1000 TAB at 09:34

## 2018-01-01 RX ADMIN — IPRATROPIUM BROMIDE AND ALBUTEROL SULFATE 1 AMPULE: .5; 3 SOLUTION RESPIRATORY (INHALATION) at 11:16

## 2018-01-01 RX ADMIN — INSULIN LISPRO 1 UNITS: 100 INJECTION, SOLUTION INTRAVENOUS; SUBCUTANEOUS at 21:13

## 2018-01-01 RX ADMIN — DOCUSATE SODIUM 100 MG: 100 CAPSULE, LIQUID FILLED ORAL at 21:11

## 2018-01-01 RX ADMIN — FUROSEMIDE 5 MG/HR: 10 INJECTION, SOLUTION INTRAVENOUS at 10:12

## 2018-01-01 RX ADMIN — IPRATROPIUM BROMIDE AND ALBUTEROL SULFATE 1 AMPULE: .5; 3 SOLUTION RESPIRATORY (INHALATION) at 11:36

## 2018-01-01 RX ADMIN — ASPIRIN 81 MG CHEWABLE TABLET 81 MG: 81 TABLET CHEWABLE at 09:18

## 2018-01-01 RX ADMIN — DOPAMINE HYDROCHLORIDE IN DEXTROSE 7.5 MCG/KG/MIN: 1.6 INJECTION, SOLUTION INTRAVENOUS at 06:54

## 2018-01-01 RX ADMIN — INSULIN LISPRO 1 UNITS: 100 INJECTION, SOLUTION INTRAVENOUS; SUBCUTANEOUS at 21:07

## 2018-01-01 RX ADMIN — SODIUM CHLORIDE, PRESERVATIVE FREE 10 ML: 5 INJECTION INTRAVENOUS at 21:15

## 2018-01-01 RX ADMIN — MIDODRINE HYDROCHLORIDE 10 MG: 5 TABLET ORAL at 09:34

## 2018-01-01 RX ADMIN — BUDESONIDE 500 MCG: 0.5 INHALANT RESPIRATORY (INHALATION) at 07:22

## 2018-01-01 RX ADMIN — SODIUM CHLORIDE, PRESERVATIVE FREE 10 ML: 5 INJECTION INTRAVENOUS at 07:55

## 2018-01-01 RX ADMIN — HEPARIN SODIUM 5000 UNITS: 5000 INJECTION INTRAVENOUS; SUBCUTANEOUS at 05:45

## 2018-01-01 RX ADMIN — Medication 400 MG: at 22:24

## 2018-01-01 RX ADMIN — INSULIN LISPRO 1 UNITS: 100 INJECTION, SOLUTION INTRAVENOUS; SUBCUTANEOUS at 09:35

## 2018-01-01 RX ADMIN — MONTELUKAST SODIUM 10 MG: 10 TABLET, COATED ORAL at 20:35

## 2018-01-01 RX ADMIN — BUDESONIDE 500 MCG: 0.5 INHALANT RESPIRATORY (INHALATION) at 07:36

## 2018-01-01 RX ADMIN — INSULIN LISPRO 1 UNITS: 100 INJECTION, SOLUTION INTRAVENOUS; SUBCUTANEOUS at 18:10

## 2018-01-01 RX ADMIN — FUROSEMIDE 60 MG: 10 INJECTION, SOLUTION INTRAVENOUS at 02:53

## 2018-01-01 RX ADMIN — HEPARIN SODIUM 5000 UNITS: 5000 INJECTION, SOLUTION INTRAVENOUS; SUBCUTANEOUS at 21:31

## 2018-01-01 RX ADMIN — HEPARIN SODIUM 5000 UNITS: 5000 INJECTION INTRAVENOUS; SUBCUTANEOUS at 14:06

## 2018-01-01 RX ADMIN — NITROGLYCERIN 0.5 INCH: 20 OINTMENT TOPICAL at 12:34

## 2018-01-01 RX ADMIN — MIDODRINE HYDROCHLORIDE 10 MG: 5 TABLET ORAL at 19:20

## 2018-01-01 RX ADMIN — BUDESONIDE 500 MCG: 0.5 INHALANT RESPIRATORY (INHALATION) at 08:35

## 2018-01-01 RX ADMIN — ALBUTEROL SULFATE 2 PUFF: 90 AEROSOL, METERED RESPIRATORY (INHALATION) at 08:37

## 2018-01-01 RX ADMIN — IPRATROPIUM BROMIDE AND ALBUTEROL SULFATE 1 AMPULE: .5; 3 SOLUTION RESPIRATORY (INHALATION) at 16:37

## 2018-01-01 RX ADMIN — SODIUM CHLORIDE 1000 ML: 900 INJECTION INTRAVENOUS at 19:56

## 2018-01-01 RX ADMIN — CYANOCOBALAMIN TAB 1000 MCG 500 MCG: 1000 TAB at 08:57

## 2018-01-01 RX ADMIN — METOLAZONE 2.5 MG: 2.5 TABLET ORAL at 09:18

## 2018-01-01 RX ADMIN — MIDODRINE HYDROCHLORIDE 10 MG: 5 TABLET ORAL at 09:43

## 2018-01-01 RX ADMIN — HEPARIN SODIUM 5000 UNITS: 5000 INJECTION INTRAVENOUS; SUBCUTANEOUS at 14:38

## 2018-01-01 RX ADMIN — PANTOPRAZOLE SODIUM 40 MG: 40 TABLET, DELAYED RELEASE ORAL at 10:08

## 2018-01-01 RX ADMIN — INSULIN LISPRO 1 UNITS: 100 INJECTION, SOLUTION INTRAVENOUS; SUBCUTANEOUS at 12:12

## 2018-01-01 RX ADMIN — BUDESONIDE 500 MCG: 0.5 INHALANT RESPIRATORY (INHALATION) at 03:02

## 2018-01-01 RX ADMIN — HEPARIN SODIUM 5000 UNITS: 5000 INJECTION INTRAVENOUS; SUBCUTANEOUS at 14:26

## 2018-01-01 RX ADMIN — ASPIRIN 81 MG CHEWABLE TABLET 81 MG: 81 TABLET CHEWABLE at 08:41

## 2018-01-01 RX ADMIN — BUDESONIDE 500 MCG: 0.5 INHALANT RESPIRATORY (INHALATION) at 07:58

## 2018-01-01 RX ADMIN — ASPIRIN 81 MG 81 MG: 81 TABLET ORAL at 10:44

## 2018-01-01 RX ADMIN — TORSEMIDE 20 MG: 20 TABLET ORAL at 07:54

## 2018-01-01 RX ADMIN — INSULIN LISPRO 1 UNITS: 100 INJECTION, SOLUTION INTRAVENOUS; SUBCUTANEOUS at 21:09

## 2018-01-01 RX ADMIN — INSULIN LISPRO 1 UNITS: 100 INJECTION, SOLUTION INTRAVENOUS; SUBCUTANEOUS at 21:33

## 2018-01-01 RX ADMIN — SENNOSIDES 8.6 MG: 8.6 TABLET, FILM COATED ORAL at 23:56

## 2018-01-01 RX ADMIN — MIDODRINE HYDROCHLORIDE 10 MG: 5 TABLET ORAL at 18:50

## 2018-01-01 RX ADMIN — INSULIN LISPRO 1 UNITS: 100 INJECTION, SOLUTION INTRAVENOUS; SUBCUTANEOUS at 09:10

## 2018-01-01 RX ADMIN — DOCUSATE SODIUM 100 MG: 100 CAPSULE, LIQUID FILLED ORAL at 21:31

## 2018-01-01 RX ADMIN — INSULIN LISPRO 1 UNITS: 100 INJECTION, SOLUTION INTRAVENOUS; SUBCUTANEOUS at 12:25

## 2018-01-01 RX ADMIN — INFLUENZA A VIRUS A/MICHIGAN/45/2015 X-275 (H1N1) ANTIGEN (FORMALDEHYDE INACTIVATED), INFLUENZA A VIRUS A/SINGAPORE/INFIMH-16-0019/2016 IVR-186 (H3N2) ANTIGEN (FORMALDEHYDE INACTIVATED), INFLUENZA B VIRUS B/PHUKET/3073/2013 ANTIGEN (FORMALDEHYDE INACTIVATED), AND INFLUENZA B VIRUS B/MARYLAND/15/2016 BX-69A ANTIGEN (FORMALDEHYDE INACTIVATED) 0.5 ML: 15; 15; 15; 15 INJECTION, SUSPENSION INTRAMUSCULAR at 08:05

## 2018-01-01 RX ADMIN — HEPARIN SODIUM 5000 UNITS: 5000 INJECTION, SOLUTION INTRAVENOUS; SUBCUTANEOUS at 05:27

## 2018-01-01 RX ADMIN — INSULIN LISPRO 3 UNITS: 100 INJECTION, SOLUTION INTRAVENOUS; SUBCUTANEOUS at 12:25

## 2018-01-01 RX ADMIN — DOPAMINE HYDROCHLORIDE IN DEXTROSE 5 MCG/KG/MIN: 1.6 INJECTION, SOLUTION INTRAVENOUS at 22:54

## 2018-01-01 RX ADMIN — TORSEMIDE 20 MG: 20 TABLET ORAL at 08:59

## 2018-01-01 RX ADMIN — HEPARIN SODIUM 5000 UNITS: 5000 INJECTION INTRAVENOUS; SUBCUTANEOUS at 12:37

## 2018-01-01 RX ADMIN — MIDODRINE HYDROCHLORIDE 10 MG: 5 TABLET ORAL at 13:29

## 2018-01-01 RX ADMIN — METOPROLOL TARTRATE 25 MG: 25 TABLET ORAL at 09:35

## 2018-01-01 RX ADMIN — MIDODRINE HYDROCHLORIDE 10 MG: 5 TABLET ORAL at 10:34

## 2018-01-01 RX ADMIN — HEPARIN SODIUM 5000 UNITS: 5000 INJECTION INTRAVENOUS; SUBCUTANEOUS at 22:01

## 2018-01-01 RX ADMIN — PANTOPRAZOLE SODIUM 40 MG: 40 TABLET, DELAYED RELEASE ORAL at 06:22

## 2018-01-01 RX ADMIN — PANTOPRAZOLE SODIUM 40 MG: 40 TABLET, DELAYED RELEASE ORAL at 05:57

## 2018-01-01 RX ADMIN — BUDESONIDE 500 MCG: 0.5 INHALANT RESPIRATORY (INHALATION) at 20:19

## 2018-01-01 RX ADMIN — INSULIN LISPRO 2 UNITS: 100 INJECTION, SOLUTION INTRAVENOUS; SUBCUTANEOUS at 12:56

## 2018-01-01 RX ADMIN — MIDODRINE HYDROCHLORIDE 10 MG: 5 TABLET ORAL at 12:23

## 2018-01-01 RX ADMIN — TORSEMIDE 20 MG: 20 TABLET ORAL at 21:31

## 2018-01-01 RX ADMIN — BUDESONIDE 500 MCG: 0.5 INHALANT RESPIRATORY (INHALATION) at 07:21

## 2018-01-01 RX ADMIN — HEPARIN SODIUM 5000 UNITS: 5000 INJECTION INTRAVENOUS; SUBCUTANEOUS at 06:30

## 2018-01-01 RX ADMIN — METOPROLOL TARTRATE 25 MG: 25 TABLET ORAL at 21:06

## 2018-01-01 RX ADMIN — MIDODRINE HYDROCHLORIDE 10 MG: 5 TABLET ORAL at 12:34

## 2018-01-01 RX ADMIN — HEPARIN SODIUM 5000 UNITS: 5000 INJECTION INTRAVENOUS; SUBCUTANEOUS at 06:26

## 2018-01-01 RX ADMIN — DOPAMINE HYDROCHLORIDE IN DEXTROSE 5 MCG/KG/MIN: 1.6 INJECTION, SOLUTION INTRAVENOUS at 08:01

## 2018-01-01 RX ADMIN — MULTIPLE VITAMINS W/ MINERALS TAB 1 TABLET: TAB at 08:05

## 2018-01-01 RX ADMIN — HEPARIN SODIUM 5000 UNITS: 5000 INJECTION INTRAVENOUS; SUBCUTANEOUS at 05:39

## 2018-01-01 RX ADMIN — SENNOSIDES 8.6 MG: 8.6 TABLET, FILM COATED ORAL at 09:43

## 2018-01-01 RX ADMIN — ALBUTEROL SULFATE 2 PUFF: 90 AEROSOL, METERED RESPIRATORY (INHALATION) at 12:34

## 2018-01-01 RX ADMIN — SIMVASTATIN 20 MG: 20 TABLET, FILM COATED ORAL at 20:34

## 2018-01-01 RX ADMIN — SIMVASTATIN 20 MG: 20 TABLET, FILM COATED ORAL at 21:15

## 2018-01-01 RX ADMIN — HEPARIN SODIUM 5000 UNITS: 5000 INJECTION INTRAVENOUS; SUBCUTANEOUS at 05:41

## 2018-01-01 RX ADMIN — PANTOPRAZOLE SODIUM 40 MG: 40 TABLET, DELAYED RELEASE ORAL at 06:04

## 2018-01-01 RX ADMIN — ALBUTEROL SULFATE 2 PUFF: 90 AEROSOL, METERED RESPIRATORY (INHALATION) at 07:35

## 2018-01-01 RX ADMIN — MONTELUKAST SODIUM 10 MG: 10 TABLET, FILM COATED ORAL at 22:34

## 2018-01-01 RX ADMIN — SENNOSIDES 8.6 MG: 8.6 TABLET, FILM COATED ORAL at 20:48

## 2018-01-01 RX ADMIN — PANTOPRAZOLE SODIUM 40 MG: 40 TABLET, DELAYED RELEASE ORAL at 05:39

## 2018-01-01 RX ADMIN — CYANOCOBALAMIN TAB 1000 MCG 500 MCG: 1000 TAB at 08:56

## 2018-01-01 RX ADMIN — ALBUTEROL SULFATE 2 PUFF: 90 AEROSOL, METERED RESPIRATORY (INHALATION) at 12:41

## 2018-01-01 RX ADMIN — INSULIN LISPRO 1 UNITS: 100 INJECTION, SOLUTION INTRAVENOUS; SUBCUTANEOUS at 21:37

## 2018-01-01 RX ADMIN — MAGNESIUM SULFATE HEPTAHYDRATE 2 G: 40 INJECTION, SOLUTION INTRAVENOUS at 09:02

## 2018-01-01 RX ADMIN — DOPAMINE HYDROCHLORIDE IN DEXTROSE 5 MCG/KG/MIN: 1.6 INJECTION, SOLUTION INTRAVENOUS at 00:15

## 2018-01-01 RX ADMIN — INSULIN LISPRO 1 UNITS: 100 INJECTION, SOLUTION INTRAVENOUS; SUBCUTANEOUS at 17:41

## 2018-01-01 RX ADMIN — CYANOCOBALAMIN TAB 1000 MCG 500 MCG: 1000 TAB at 07:55

## 2018-01-01 RX ADMIN — HEPARIN SODIUM 5000 UNITS: 5000 INJECTION, SOLUTION INTRAVENOUS; SUBCUTANEOUS at 05:57

## 2018-01-01 RX ADMIN — SODIUM CHLORIDE, PRESERVATIVE FREE 10 ML: 5 INJECTION INTRAVENOUS at 10:15

## 2018-01-01 RX ADMIN — MIDODRINE HYDROCHLORIDE 10 MG: 5 TABLET ORAL at 08:56

## 2018-01-01 RX ADMIN — HEPARIN SODIUM 5000 UNITS: 5000 INJECTION, SOLUTION INTRAVENOUS; SUBCUTANEOUS at 22:35

## 2018-01-01 RX ADMIN — MIDODRINE HYDROCHLORIDE 10 MG: 5 TABLET ORAL at 15:20

## 2018-01-01 RX ADMIN — INSULIN LISPRO 1 UNITS: 100 INJECTION, SOLUTION INTRAVENOUS; SUBCUTANEOUS at 12:38

## 2018-01-01 RX ADMIN — HEPARIN SODIUM 5000 UNITS: 5000 INJECTION INTRAVENOUS; SUBCUTANEOUS at 20:35

## 2018-01-01 RX ADMIN — METOPROLOL TARTRATE 25 MG: 25 TABLET ORAL at 07:54

## 2018-01-01 RX ADMIN — ALBUTEROL SULFATE 2 PUFF: 90 AEROSOL, METERED RESPIRATORY (INHALATION) at 21:20

## 2018-01-01 RX ADMIN — ASPIRIN 81 MG CHEWABLE TABLET 81 MG: 81 TABLET CHEWABLE at 07:55

## 2018-01-01 RX ADMIN — TORSEMIDE 20 MG: 20 TABLET ORAL at 21:27

## 2018-01-01 RX ADMIN — FLUCONAZOLE 100 MG: 2 INJECTION, SOLUTION INTRAVENOUS at 12:48

## 2018-01-01 RX ADMIN — IPRATROPIUM BROMIDE AND ALBUTEROL SULFATE 1 AMPULE: .5; 3 SOLUTION RESPIRATORY (INHALATION) at 15:15

## 2018-01-01 RX ADMIN — DOCUSATE SODIUM 100 MG: 100 CAPSULE, LIQUID FILLED ORAL at 10:22

## 2018-01-01 RX ADMIN — HEPARIN SODIUM 2600 UNITS: 1000 INJECTION, SOLUTION INTRAVENOUS; SUBCUTANEOUS at 16:45

## 2018-01-01 ASSESSMENT — PAIN SCALES - GENERAL
PAINLEVEL_OUTOF10: 3
PAINLEVEL_OUTOF10: 7
PAINLEVEL_OUTOF10: 0
PAINLEVEL_OUTOF10: 10
PAINLEVEL_OUTOF10: 3
PAINLEVEL_OUTOF10: 0
PAINLEVEL_OUTOF10: 0
PAINLEVEL_OUTOF10: 5
PAINLEVEL_OUTOF10: 0
PAINLEVEL_OUTOF10: 3
PAINLEVEL_OUTOF10: 2
PAINLEVEL_OUTOF10: 5
PAINLEVEL_OUTOF10: 0
PAINLEVEL_OUTOF10: 5
PAINLEVEL_OUTOF10: 6
PAINLEVEL_OUTOF10: 0
PAINLEVEL_OUTOF10: 5
PAINLEVEL_OUTOF10: 10
PAINLEVEL_OUTOF10: 0
PAINLEVEL_OUTOF10: 0
PAINLEVEL_OUTOF10: 3
PAINLEVEL_OUTOF10: 4
PAINLEVEL_OUTOF10: 0

## 2018-01-01 ASSESSMENT — PAIN DESCRIPTION - PROGRESSION

## 2018-01-01 ASSESSMENT — PAIN DESCRIPTION - PAIN TYPE
TYPE: ACUTE PAIN
TYPE: CHRONIC PAIN
TYPE: ACUTE PAIN
TYPE: CHRONIC PAIN
TYPE: ACUTE PAIN

## 2018-01-01 ASSESSMENT — PAIN DESCRIPTION - ORIENTATION
ORIENTATION: RIGHT
ORIENTATION: RIGHT;LEFT
ORIENTATION: RIGHT
ORIENTATION: RIGHT

## 2018-01-01 ASSESSMENT — ENCOUNTER SYMPTOMS
PHOTOPHOBIA: 0
BACK PAIN: 1
SHORTNESS OF BREATH: 1
CONSTIPATION: 1
ABDOMINAL DISTENTION: 1
ABDOMINAL PAIN: 0
SORE THROAT: 0
SHORTNESS OF BREATH: 1
COUGH: 1
NAUSEA: 1
SINUS PRESSURE: 0

## 2018-01-01 ASSESSMENT — PAIN DESCRIPTION - LOCATION
LOCATION: CHEST
LOCATION: BACK
LOCATION: ABDOMEN
LOCATION: BACK;LEG
LOCATION: BACK
LOCATION: LEG
LOCATION: FOOT;LEG
LOCATION: LEG
LOCATION: FOOT;LEG

## 2018-01-01 ASSESSMENT — PAIN DESCRIPTION - ONSET
ONSET: SUDDEN
ONSET: ON-GOING

## 2018-01-01 ASSESSMENT — PAIN DESCRIPTION - FREQUENCY
FREQUENCY: CONTINUOUS
FREQUENCY: CONTINUOUS
FREQUENCY: INTERMITTENT
FREQUENCY: INTERMITTENT
FREQUENCY: CONTINUOUS

## 2018-01-01 ASSESSMENT — PAIN DESCRIPTION - DESCRIPTORS
DESCRIPTORS: ACHING
DESCRIPTORS: ACHING;SHARP
DESCRIPTORS: SHARP

## 2018-08-27 NOTE — PROGRESS NOTES
mouth once a week 10/12/17 Patient states she takes this on Thursdays      ferrous sulfate 325 (65 FE) MG tablet Take 325 mg by mouth daily (with breakfast)       cephALEXin (KEFLEX) 500 MG capsule TK ONE C PO  TID  0    Respiratory Therapy Supplies (NEBULIZER AIR TUBE/PLUGS) MISC 1 Device by Does not apply route as needed (as needed) 1 each 0     No current facility-administered medications for this visit. Allergies: Lisinopril  Past Medical History:   Diagnosis Date    CAD (coronary artery disease)     COPD (chronic obstructive pulmonary disease) ([facility]     Diabetes mellitus ([facility]     Gallstones     H/O cardiovascular stress test 9/2007, 9/2009, 7/2010 7/6/2010-Rest ef is 67%. Global LV systolic function is normal. No ECG changes. Unremarkable pharmacological stress test.    H/O complete electrocardiogram 5/13/2010    H/O Doppler ultrasound carotid doppler 2/2009, 8/2010 8/25/2010-heterogeneous irregular athresclerotic plaque in the R internal carotid artery.  H/O echocardiogram 11/07/2013    EF=>55%, normal LV systolic function, mild mitral regurg, severe mitral stenosis, severe pulmonary HTN, bioprosthetic aortic valve.  H/O echocardiogram 10/01/15    EF 60% Mildly hypertrophied LV with normal systolic function. prosthetic valve noted in aortic position. Mod pulmon HTN.  MR does not seem to be severe as in the past the mitral valve area 2.16 but heavily calcified and the mitral valve still appears to be stenotic.     H/O echocardiogram 07/13/2016    EF 60%, Mild LVH, Mod dilated LA, Mod Mitral Stenosis, Severe Pulm HTN, Significant valvular disease, Severe MS by pressure gradient    Hx of CABG     Hyperlipidemia     Hypertension     Murmur 1987    S/P AVR     Type II or unspecified type diabetes mellitus without mention of complication, not stated as uncontrolled      Past Surgical History:   Procedure Laterality Date    CARDIAC SURGERY      CORONARY ARTERY BYPASS GRAFT

## 2018-09-20 NOTE — TELEPHONE ENCOUNTER
Home care nurse called saying that patients HR is 120 apically. Normally runs 80-90. Patient is sitting down at rest. No other symptoms.

## 2018-10-02 NOTE — TELEPHONE ENCOUNTER
Per Dr. Sandra Montano, patient should increast Metoprolol to 37.5 mg BID. Will advised and voiced understanding.

## 2018-10-21 PROBLEM — N17.9 AKI (ACUTE KIDNEY INJURY) (HCC): Status: ACTIVE | Noted: 2018-01-01

## 2018-10-22 NOTE — PROCEDURES
[facility] - General Surgery    PATIENT: Ana Estevez 1940, [de-identified] y.o., female    MRN: [id number]    DATE: October 22, 2018    PRE-OP DIAGNOSIS: hemodynamic instability       POST-OP DIAGNOSIS: Same    PROCEDURE(S): Placement of right internal jugular vein central venous catheter under ultrasound guidance     SURGEON(S): Reji Wang MD    ASSISTANT(S):  none    ANESTHESIA: Local 1% Xylocaine, ~5mL in total    ESTIMATED BLOOD LOSS:  Less then 5 ml           COMPLICATIONS: none           CONDITION / DISPOSITION:  Stable    INDICATIONS: Marifer Dangelo is a [de-identified] y.o. female presenting with hemodynamic instability and need for central venous access. The risks, benefits, potential complications and possible alternatives of the procedure were discussed in detail, including complications of but not limited to infection, bleeding, pneumothorax, need for chest tube placement, or need for  different venous access. All questions were answered. The patient wished to proceed and consent was documented in the medical record. PROCEDURE IN DETAIL:   After informed consent was obtained, a pre-procedural time-out was conducted, and the patient was placed in Trendelenburg position. The right neck was prepped and draped in the usual sterile fashion with chlorhexidine. The right internal jugular vein was identified. A small amount of 1% lidocaine without epinephrine was infiltrated into the subcutaneous tissues overlying the vein. A finder needle was then used to access the vein under ultrasound guidance. Dark, venous, non pulsatile blood flow was observed. A guide wire was passed without difficulty. A skin nick was made. The finder needle was removed over the guidewire and a dilator was passed without difficulty. A 7F triple lumen central venous catheter was passed over the wire using Seldinger technique and secured in place at 15 cm with silk suture. The guidewire was removed.

## 2018-10-22 NOTE — PROGRESS NOTES
PRN   glucose 15 g PRN   dextrose 12.5 g PRN   glucagon (rDNA) 1 mg PRN   dextrose 100 mL/hr PRN         Electronically signed by Katelynn Molina MD on 10/22/2018 at 10:08 AM

## 2018-10-22 NOTE — CONSULTS
not drink alcohol. OCCUPATION:      Family History:   Family History   Problem Relation Age of Onset    Cancer Mother     Heart Disease Father     High Blood Pressure Father        REVIEW OF SYSTEMS:  Negative except for soa which is better, edema much better on left leg    Physical Exam:    Vitals: /66   Pulse 116   Temp 97.5 °F (36.4 °C) (Axillary)   Resp 19   Ht 5' (1.524 m)   Wt 209 lb 7 oz (95 kg)   SpO2 100%   BMI 40.90 kg/m²   General appearance: alert, appears stated age and cooperative  Skin: Skin color, texture, turgor normal. No rashes or lesions  HEENT: Head: Normal, normocephalic, atraumatic. Neck: no adenopathy, no carotid bruit, no JVD, supple, symmetrical, trachea midline and thyroid not enlarged, symmetric, no tenderness/mass/nodules  Lungs: diminished breath sounds bibasilar  Heart: regular rate and rhythm, S1, S2 normal, no murmur, click, rub or gallop  Abdomen: soft, non-tender; bowel sounds normal; no masses,  no organomegaly  Extremities: edema 1 plus rt and 2 plus left  Neurologic: Mental status: alertness: alert    CBC:   Recent Labs      10/21/18   1942   WBC  8.2   HGB  11.3*   PLT  124*     BMP:    Recent Labs      10/21/18   1942   NA  140   K  5.4*   CL  99   CO2  30   BUN  76*   CREATININE  2.7*   GLUCOSE  173*     Hepatic:   Recent Labs      10/21/18   1942   AST  38*   ALT  10   BILITOT  0.8   ALKPHOS  57     Troponin: No results for input(s): TROPONINI in the last 72 hours. BNP: No results for input(s): BNP in the last 72 hours. Lipids: No results for input(s): CHOL, HDL in the last 72 hours. Invalid input(s): LDLCALCU  ABGs: No results found for: PHART, PO2ART, EAU3VUB  INR: No results for input(s): INR in the last 72 hours.   -----------------------------------------------------------------      Assessment and Recommendations     Patient Active Problem List   Diagnosis Code    Hypertension I10    Hyperlipidemia E78.5    Diabetes mellitus ([facility] [address] E11.9    Hx

## 2018-10-22 NOTE — PROGRESS NOTES
[address] [place] [address] [address] [place] [address]  Phone: [phone]  Office Hours: 8:30AM - 4:30PM  Monday - Friday     Nephrology consult completed  Full note to follow

## 2018-10-22 NOTE — CARE COORDINATION
Met with pt to initiate discharge planning. Pt lives at home. Pt's bedroom and bathroom are on the main level. Pt's laundry is in the basement, but pt's sons do laundry. Pt has two sons who work 1st and 2nd shift. Pt has o2, nebulizers, walker, wheelchair, shower seat (no longer uses), and BSC. Pt has pcp and Medicare insurance. Pt has OT and a nurse that comes out to the home from [address] [place] [address] Rd. Pt no longer wanted Meals on Wheels. Pt would like a larger BSC. TC to DME and it cost $124.84 for Heavy Duty BSC because pt does not qualify for insurance due to weight. I will give the measurements and cost of the [facility] to pt.      Keyur Walter, Social Work Student

## 2018-10-22 NOTE — CONSULTS
children: 2    Years of education: 15     Occupational History    Retired 2010      Social History Main Topics    Smoking status: Former Smoker     Packs/day: 2.00     Years: 25.00     Types: Cigarettes     Quit date: 1/1/1981    Smokeless tobacco: Never Used      Comment: reviewed 9/24/15    Alcohol use No      Comment: yadira    Drug use: No    Sexual activity: No      Comment:      Other Topics Concern    None     Social History Narrative    None       Family History:   Family History   Problem Relation Age of Onset    Cancer Mother     Heart Disease Father     High Blood Pressure Father        REVIEW OF SYSTEMS:    Review of Systems   Unable to perform ROS: Acuity of condition   Respiratory: Positive for shortness of breath. Cardiovascular: Positive for leg swelling. Negative for palpitations. I have reviewed thepatient's information pertinent to this visit, including medical history, family history, social history and review of systems. PHYSICAL EXAM:    Vitals:    10/22/18 0051 10/22/18 0112 10/22/18 0117 10/22/18 0130   BP: 101/70 (!) 83/51 (!) 88/52 (!) 88/51   Pulse: 112 111 111 104   Resp: 21 22 10 14   Temp:       TempSrc:       SpO2:       Weight:       Height:           Physical Exam   Constitutional: She is oriented to person, place, and time. She appears well-developed and well-nourished. No distress. HENT:   Head: Normocephalic and atraumatic. Eyes: Pupils are equal, round, and reactive to light. Right eye exhibits no discharge. Left eye exhibits no discharge. Neck: No tracheal deviation present. ROM limited by kyphosis   Cardiovascular: Normal rate and regular rhythm. Hypotensive in 80's SBP   Pulmonary/Chest: Effort normal. No respiratory distress. She has no wheezes. Abdominal: Soft. She exhibits no distension. There is no tenderness. There is no rebound and no guarding.    Mild panniculitis with yeast smell, bilateral groins   Musculoskeletal: She exhibits no tenderness. Significant cervical kyphosis   Neurological: She is alert and oriented to person, place, and time. Skin: Skin is warm and dry. No rash noted. She is not diaphoretic. Psychiatric: She has a normal mood and affect. Her behavior is normal.       DATA:    Lab Results   Component Value Date    WBC 8.2 10/21/2018    HGB 11.3 (L) 10/21/2018    HCT 36.5 (L) 10/21/2018     (L) 10/21/2018     10/21/2018    K 5.4 (H) 10/21/2018    CL 99 10/21/2018    CO2 30 10/21/2018    BUN 76 (H) 10/21/2018    CREATININE 2.7 (H) 10/21/2018    GLUCOSE 173 (H) 10/21/2018    CALCIUM 8.8 10/21/2018    PROT 6.2 (L) 10/21/2018    BILITOT 0.8 10/21/2018    AST 38 (H) 10/21/2018    ALT 10 10/21/2018    ALKPHOS 57 10/21/2018    LIPASE 34 10/21/2018    INR 1.04 05/29/2013    GLUF 179 (H) 06/14/2018    LABA1C 7.2 (H) 06/14/2018       Imaging:   Xr Chest Portable    Result Date: 10/21/2018  EXAMINATION: SINGLE XRAY VIEW OF THE CHEST 10/21/2018 6:22 pm COMPARISON: CT chest 10/17/2017 and AP chest 10/12/2017 HISTORY: ORDERING SYSTEM PROVIDED HISTORY: SOB TECHNOLOGIST PROVIDED HISTORY: Reason for exam:->SOB Ordering Physician Provided Reason for Exam: SOB Acuity: Acute Type of Exam: Initial FINDINGS: The cardiac silhouette is enlarged. Calcifications involving the aorta reflect atherosclerosis. The mediastinal and hilar silhouettes appear unremarkable. Sequela from old granulomatous disease and chronic interstitial densities are unchanged throughout the lungs bilateral.  Mild blunting right lateral and left lateral costophrenic sulci. No dense consolidation. No pneumothorax is seen. No acute osseous abnormality is identified. Previous median sternotomy. Sequela from CABG. 1. Possible bilateral pleural effusion versus pleural thickening. 2. Chronic interstitial changes and sequela from old granulomatous disease. 3. Calcific atherosclerosis aorta. 4. Cardiomegaly. 5. Remote open heart surgery. Pertinentlaboratory and imaging studies were personally reviewed if available. IMPRESSION:    Bao Bean is a [de-identified] y.o. female with STAT request for central venous access due to hypotension and need for dobutamine, poor IV access, CHF exacerbation    Patient Active Problem List    Diagnosis Date Noted    Coronary artery disease involving coronary bypass graft of native heart without angina pectoris      Priority: High    Chronic obstructive pulmonary disease ([facility] [address]      Priority: High    Essential hypertension      Priority: High    Acute on chronic congestive heart failure (HCC)      Priority: High    Rheumatic mitral stenosis      Priority: High    DEMETRIUS (acute kidney injury) ([facility] [address] 10/21/2018    Acute on chronic diastolic heart failure ([address] 10/12/2017    COPD (chronic obstructive pulmonary disease) ([facility] 10/12/2017    YAYO (obstructive sleep apnea) 10/12/2017    Pneumonia 01/28/2014    Hypoxia 01/28/2014    COPD with acute exacerbation ([facility] [facility] 01/28/2014    CHF (congestive heart failure) ([facility] [facility] 01/28/2014    CAD (coronary artery disease) 11/05/2013    Hypertension     Hyperlipidemia     Diabetes mellitus ([facility]     Hx of CABG     S/P AVR        Visit Diagnoses:  1. Dyspnea, unspecified type    2. Hypoxia    3. Cardiomegaly    4. Pleural effusion    5. Serum creatinine raised    6. Elevated brain natriuretic peptide (BNP) level    7. Troponin level elevated        PLAN:  · Right IJ CVC placed.  (Femoral access not an option due to pannus and yeast)  · CXR ordered  · Routine care      Electronically signed by Deloris Romero MD, 10/22/2018, 2:43 AM

## 2018-10-22 NOTE — CONSULTS
GRAFT  4/2010    AVR @ [facility]    CORONARY ARTERY BYPASS GRAFT  4/2010    [facility]    JOINT REPLACEMENT Bilateral     knees    KNEE SURGERY      bilateral knee replacement       FAMILY HISTORY    Family History   Problem Relation Age of Onset    Cancer Mother     Heart Disease Father     High Blood Pressure Father        SOCIAL HISTORY    Social History   Substance Use Topics    Smoking status: Former Smoker     Packs/day: 2.00     Years: 25.00     Types: Cigarettes     Quit date: 1/1/1981    Smokeless tobacco: Never Used      Comment: reviewed 9/24/15    Alcohol use No      Comment: rarley       ALLERGIES    Allergies   Allergen Reactions    Lisinopril        MEDICATIONS    No current facility-administered medications on file prior to encounter.       Current Outpatient Prescriptions on File Prior to Encounter   Medication Sig Dispense Refill    OXYGEN Inhale 2 L into the lungs      montelukast (SINGULAIR) 10 MG tablet Take 10 mg by mouth nightly      Liraglutide (VICTOZA SC) Inject into the skin      cephALEXin (KEFLEX) 500 MG capsule TK ONE C PO  TID  0    budesonide (PULMICORT) 0.5 MG/2ML nebulizer suspension Take 2 mLs by nebulization 2 times daily Dx Code COPD J44.9 60 ampule 5    albuterol sulfate HFA (PROAIR HFA) 108 (90 Base) MCG/ACT inhaler Inhale 2 puffs into the lungs 4 times daily 1 Inhaler 5    ipratropium-albuterol (DUONEB) 0.5-2.5 (3) MG/3ML SOLN nebulizer solution USE 3 ML VIA NEBULIZER FOUR TIMES DAILY 3240 mL 5    torsemide (DEMADEX) 20 MG tablet TK 1 T PO  BID  3    metolazone (ZAROXOLYN) 2.5 MG tablet TK 1 T PO  EVERY OTHER DAY ONE HOUR BEFORE TORSEMIDE  3    allopurinol (ZYLOPRIM) 100 MG tablet TK 1 T PO  D  3    Cyanocobalamin (VITAMIN B 12 PO) Take 500 mcg by mouth daily       glimepiride (AMARYL) 2 MG tablet Take 2 mg by mouth every morning (before breakfast)      Respiratory Therapy Supplies (NEBULIZER AIR TUBE/PLUGS) MISC 1 Device by Does not apply route as needed (as needed) Diagnosis    Hypertension    Hyperlipidemia    Diabetes mellitus ([facility]    Hx of CABG    S/P AVR    CAD (coronary artery disease)    Pneumonia    Hypoxia    COPD with acute exacerbation (HCC)    CHF (congestive heart failure) (HCC)    Acute on chronic diastolic heart failure (HCC)    COPD (chronic obstructive pulmonary disease) (HCC)    YAYO (obstructive sleep apnea)    Acute on chronic congestive heart failure (HCC)    Rheumatic mitral stenosis    Coronary artery disease involving coronary bypass graft of native heart without angina pectoris    Chronic obstructive pulmonary disease ([facility]    Essential hypertension    DEMETRIUS (acute kidney injury) ([facility]       Measurements:       Response to treatment:  With complaints of pain. Pain Assessment:  Severity:  Moderate with any touching of legs and feet  Quality of pain: tender and ticklish  Wound Pain Timing/Severity: when touched or washed  Premedicated: no    Plan:     Plan of Care:  Wound Team assessment reveals no open wounds or PI's. Heels intact. Patient has poor hygiene to bilateral feet and reports they are very sensitive. Noted dry, recently healed area to left medial leg and a dry, brown scab to her left 3rd toe. Washed bilateral LE's with Alphabath, rinsed, dried and painted left leg healed area and left 3rd toe eschar with Betadine. Patient having tachycardia and decreased O2 saturation and deferred buttocks assessment at this time. Spoke to RN who states pt buttocks was red, intact upon today's assessment. Instructed to notify Wound Team if anything else is noted.     Specialty Bed Required : yes  [] Low Air Loss   [x] Pressure Redistribution  [] Fluid Immersion  [] Bariatric  [] Total Pressure Relief  [] Other:     Discharge Plan:  Placement for patient upon discharge: home  Hospice Care: no  Patient appropriate for Outpatient [address]: no    Patient/Caregiver Teaching:  Level of patient/caregiver understanding able to:

## 2018-10-23 NOTE — PROGRESS NOTES
[address] [place] [address] [address] [place] [address]  Phone: [phone]  Office Hours: 8:30AM - 4:30PM  Monday - Friday     Nephrology Progress Note  10/23/2018 8:06 AM  Subjective:   Admit Date: 10/21/2018  PCP: Reshma Mckeon MD  Interval History: edema  soa not much better  Tachy   Making some urine  Has c line  Diet: DIET LOW SODIUM 2 GM; 1800 ml      Data:   Scheduled Meds:   [COMPLETED] sodium chloride  500 mL Intravenous Once    influenza virus vaccine  0.5 mL Intramuscular Once    pneumococcal 13-valent conjugate  0.5 mL Intramuscular Once    albuterol sulfate HFA  2 puff Inhalation 4x Daily    aspirin  81 mg Oral Daily    budesonide  500 mcg Nebulization BID    [START ON 10/25/2018] vitamin D  50,000 Units Oral Once per day on Thu    simvastatin  20 mg Oral Nightly    pantoprazole  40 mg Oral QAM AC    montelukast  10 mg Oral Nightly    therapeutic multivitamin-minerals  1 tablet Oral Daily    metolazone  2.5 mg Oral Every Other Day    vitamin B-12  500 mcg Oral Daily    ferrous sulfate  325 mg Oral Daily with breakfast    sodium chloride flush  10 mL Intravenous 2 times per day    docusate sodium  100 mg Oral BID    heparin (porcine)  5,000 Units Subcutaneous 3 times per day    sodium polystyrene  15 g Oral Once    insulin lispro  0-6 Units Subcutaneous TID WC    insulin lispro  0-3 Units Subcutaneous Nightly    furosemide  20 mg Intravenous BID     Continuous Infusions:   DOPamine 5 mcg/kg/min (10/23/18 0801)    dextrose       PRN Meds:ipratropium-albuterol, sodium chloride flush, ondansetron, glucose, dextrose, glucagon (rDNA), dextrose  I/O last 3 completed shifts: In: 4368 [P.O.:950; I.V.:171]  Out: 925 [Urine:925]  No intake/output data recorded.     Intake/Output Summary (Last 24 hours) at 10/23/18 0806  Last data filed at 10/23/18 0332   Gross per 24 hour   Intake             1121 ml   Output              925 ml   Net              196 ml     CBC:   Recent Labs 10/21/18   1942  10/22/18   0730   WBC  8.2  5.6   HGB  11.3*  11.6*   PLT  124*  115*     BMP:  Recent Labs      10/21/18   1942  10/22/18   0730   NA  140  142   K  5.4*  5.0   CL  99  99   CO2  30  30   BUN  76*  76*   CREATININE  2.7*  2.5*   GLUCOSE  173*  205*     Hepatic: Recent Labs      10/21/18   1942  10/22/18   0730   AST  38*  17   ALT  10  8*   BILITOT  0.8  0.9   ALKPHOS  57  64     Troponin: No results for input(s): TROPONINI in the last 72 hours. BNP: No results for input(s): BNP in the last 72 hours. Lipids: No results for input(s): CHOL, HDL in the last 72 hours. Invalid input(s): LDLCALCU  ABGs: No results found for: PHART, PO2ART, CVX5WNU  INR: No results for input(s): INR in the last 72 hours. Objective:   Vitals: BP (!) 102/53   Pulse 125   Temp 98.8 °F (37.1 °C) (Oral)   Resp 21   Ht 5' (1.524 m)   Wt 215 lb 2.7 oz (97.6 kg)   SpO2 99%   BMI 42.02 kg/m²   General appearance: alert and cooperative with exam  HEENT: Head: Normocephalic, no lesions, without obvious abnormality.   Neck: no adenopathy, no carotid bruit, no JVD, supple, symmetrical, trachea midline and thyroid not enlarged, symmetric, no tenderness/mass/nodules  Lungs: diminished breath sounds bilaterally and rhonchi bilaterally  Heart: regularly irregular rhythm  Abdomen: soft, non-tender; bowel sounds normal; no masses,  no organomegaly and obese  Extremities: edema 1-2 plus worse on rt - toes dusky blue  Neurologic: Mental status: Alert, oriented, thought content appropriate    Assessment and Plan:     Patient Active Problem List:     Hypertension     Hyperlipidemia     Diabetes mellitus ([facility] [address]     Hx of CABG     S/P AVR     CAD (coronary artery disease)     Pneumonia     Hypoxia     COPD with acute exacerbation (HCC)     CHF (congestive heart failure) (HCC)     Acute on chronic diastolic heart failure (HCC)     COPD (chronic obstructive pulmonary disease) (HCC)     YAYO (obstructive sleep apnea)     Acute on

## 2018-10-23 NOTE — CONSULTS
(PULMICORT) nebulizer suspension 500 mcg BID   [START ON 10/25/2018] vitamin D (ERGOCALCIFEROL) capsule 50,000 Units Once per day on Thu   simvastatin (ZOCOR) tablet 20 mg Nightly   pantoprazole (PROTONIX) tablet 40 mg QAM AC   montelukast (SINGULAIR) tablet 10 mg Nightly   therapeutic multivitamin-minerals 1 tablet Daily   metolazone (ZAROXOLYN) tablet 2.5 mg Every Other Day   vitamin B-12 (CYANOCOBALAMIN) tablet 500 mcg Daily   ferrous sulfate tablet 325 mg Daily with breakfast   ipratropium-albuterol (DUONEB) nebulizer solution 1 ampule Q4H PRN   sodium chloride flush 0.9 % injection 10 mL 2 times per day   sodium chloride flush 0.9 % injection 10 mL PRN   docusate sodium (COLACE) capsule 100 mg BID   ondansetron (ZOFRAN) injection 4 mg Q6H PRN   heparin (porcine) injection 5,000 Units 3 times per day   sodium polystyrene (KAYEXALATE) 15 GM/60ML suspension 15 g Once   insulin lispro (HUMALOG) injection vial 0-6 Units TID WC   insulin lispro (HUMALOG) injection vial 0-3 Units Nightly   furosemide (LASIX) injection 20 mg BID   glucose (GLUTOSE) 40 % oral gel 15 g PRN   dextrose 50 % solution 12.5 g PRN   glucagon (rDNA) injection 1 mg PRN   dextrose 5 % solution PRN   DOPamine (INTROPIN) 400 mg in dextrose 5 % 250 mL infusion Continuous     Current Facility-Administered Medications   Medication Dose Route Frequency Provider Last Rate Last Dose    influenza quadrivalent split vaccine (FLUZONE;FLUARIX;FLULAVAL;AFLURIA) injection 0.5 mL  0.5 mL Intramuscular Once Gino Baer MD        pneumococcal 13-valent conjugate (PREVNAR) injection 0.5 mL  0.5 mL Intramuscular Once Gino Baer MD        albuterol sulfate  (90 Base) MCG/ACT inhaler 2 puff  2 puff Inhalation 4x Daily YARITZA Matthews - CNP   2 puff at 10/22/18 2129    aspirin chewable tablet 81 mg  81 mg Oral Daily YARITZA Ferrari - CNP   81 mg at 10/22/18 1007    budesonide (PULMICORT) nebulizer suspension 500 mcg  500 mcg Nebulization BID Units Subcutaneous TID WC Jayro Fendt, APRN - CNP   2 Units at 10/22/18 1706    insulin lispro (HUMALOG) injection vial 0-3 Units  0-3 Units Subcutaneous Nightly Jayro Fendt, APRN - CNP   2 Units at 10/22/18 2040    furosemide (LASIX) injection 20 mg  20 mg Intravenous BID Jayro Fendt, APRN - CNP   20 mg at 10/22/18 1757    glucose (GLUTOSE) 40 % oral gel 15 g  15 g Oral PRN Jayro Fendt, APRN - CNP        dextrose 50 % solution 12.5 g  12.5 g Intravenous PRN Jayro Fendt, APRN - CNP        glucagon (rDNA) injection 1 mg  1 mg Intramuscular PRN Jayro Fendt, APRN - CNP        dextrose 5 % solution  100 mL/hr Intravenous PRN Jayro Fendt, APRN - CNP        DOPamine (INTROPIN) 400 mg in dextrose 5 % 250 mL infusion  2.5 mcg/kg/min Intravenous Continuous Oralia Zuñiga MD 26.6 mL/hr at 10/22/18 2258 7.5 mcg/kg/min at 10/22/18 2258     Review of Systems:   · Constitutional: No Fever or Weight Loss   · Eyes: No Decreased Vision  · ENT: No Headaches, Hearing Loss or Vertigo  · Cardiovascular: No chest pain, dyspnea on exertion, palpitations or loss of consciousness  · Respiratory: No cough or wheezing    · Gastrointestinal: No abdominal pain, appetite loss, blood in stools, constipation, diarrhea or heartburn  · Genitourinary: No dysuria, trouble voiding, or hematuria  · Musculoskeletal:  No gait disturbance, weakness or joint complaints  · Integumentary: No rash or pruritis  · Neurological: No TIA or stroke symptoms  · Psychiatric: No anxiety or depression  · Endocrine: No malaise, fatigue or temperature intolerance  · Hematologic/Lymphatic: No bleeding problems, blood clots or swollen lymph nodes  · Allergic/Immunologic: No nasal congestion or hives  All systems negative except as marked.      ·   ·      Physical Examination:    Vitals:    10/23/18 0332   BP: 130/76   Pulse: 128   Resp: 23   Temp: 98.8 °F (37.1 °C)   SpO2: 100%      Wt Readings from Last 3 Encounters:   10/23/18 215 lb 2.7 oz

## 2018-10-24 NOTE — PROGRESS NOTES
[address] [place] [address], [address] [address]  Phone: [phone]  Office Hours: 8:30AM - 4:30PM  Monday - Friday     Nephrology Progress Note  10/24/2018 8:30 AM  Subjective:   Admit Date: 10/21/2018  PCP: Jessi Ron MD  Interval History: edema better  soa at rest  Made a lot more urine  Has c line  Diet: DIET LOW SODIUM 2 GM; 1800 ml      Data:   Scheduled Meds:   midodrine  5 mg Oral TID WC    pneumococcal 13-valent conjugate  0.5 mL Intramuscular Once    albuterol sulfate HFA  2 puff Inhalation 4x Daily    aspirin  81 mg Oral Daily    budesonide  500 mcg Nebulization BID    [START ON 10/25/2018] vitamin D  50,000 Units Oral Once per day on Thu    simvastatin  20 mg Oral Nightly    pantoprazole  40 mg Oral QAM AC    montelukast  10 mg Oral Nightly    therapeutic multivitamin-minerals  1 tablet Oral Daily    metolazone  2.5 mg Oral Every Other Day    vitamin B-12  500 mcg Oral Daily    ferrous sulfate  325 mg Oral Daily with breakfast    sodium chloride flush  10 mL Intravenous 2 times per day    docusate sodium  100 mg Oral BID    heparin (porcine)  5,000 Units Subcutaneous 3 times per day    sodium polystyrene  15 g Oral Once    insulin lispro  0-6 Units Subcutaneous TID     insulin lispro  0-3 Units Subcutaneous Nightly     Continuous Infusions:   DOPamine 4 mcg/kg/min (10/24/18 0419)    furosemide (LASIX) 1mg/ml infusion 5 mg/hr (10/24/18 0427)    dextrose       PRN Meds:ipratropium-albuterol, sodium chloride flush, ondansetron, glucose, dextrose, glucagon (rDNA), dextrose  I/O last 3 completed shifts: In: 2067 [P.O.:749; I.V.:1318]  Out: 1475 [Urine:1475]  No intake/output data recorded.     Intake/Output Summary (Last 24 hours) at 10/24/18 0830  Last data filed at 10/24/18 0416   Gross per 24 hour   Intake             2067 ml   Output             1475 ml   Net              592 ml     CBC:   Recent Labs      10/21/18   1942  10/22/18   0730   WBC  8.2  5.6 chronic congestive heart failure (HCC)     Rheumatic mitral stenosis     Coronary artery disease involving coronary bypass graft of native heart without angina pectoris     Chronic obstructive pulmonary disease (HCC)     Essential hypertension     DEMETRIUS (acute kidney injury) (HCC)  IMP  DEMETRIUS- cardiorenal vs ATN  Severe pulm HTN  Rt heart failure with tachycardia  Hypotension  CKD3  Fluid overload      Plan     cont low dose lasix infusion  Cont midodrine  Wean dopamine  Complex medical challenge complicated by Pulm HTN and fluid overload   Creat stable and K is normal      Peggy Uribe MD

## 2018-10-24 NOTE — CONSULTS
Electrophysiology Consult Note      Reason for consultation: Paroxysmal Atrial Fibrillation    Chief complaint: SOB and weakness    Referring physician: Dr Reagan Rock    Primary care physician: Les Whiting MD    History of Present Illness:     Gali Robertson is an [de-identified]year old female with a past medical history of  HTN, CABG, S/P AVR,  YAYO, DM, Mitral valve regurgitation, mitral valve stenosis, and HLD. Ms James Saenz presented to the ER with complaints of SOB and generalized weakness. She stated that her SOB started 2 weeks ago and has progressively become worse. She states that as a result of her SOB she has become progressively more fatigued and developed generalized weakness. She states that she has been dizzy at times along with nausea, constipation, and has had difficulty with urination. She complains of lower leg edema however states that it is improving since she started having her legs wrapped. She denies CP and palpitations. Patient has prolonged valvular heart disease. She had aortic valve replacement in [facility] and she had mitral valve disease which was noted at that time and she was deemed not surgical for it at that time. She had a second opinion at [facility] and same was reported to her. Past medical history:   Past Medical History:   Diagnosis Date    CAD (coronary artery disease)     [facility] [address]     Diabetes mellitus ([facility] [address]     Gallstones     H/O cardiovascular stress test 9/2007, 9/2009, 7/2010 7/6/2010-Rest ef is 67%. Global LV systolic function is normal. No ECG changes. Unremarkable pharmacological stress test.    H/O complete electrocardiogram 5/13/2010    H/O Doppler ultrasound carotid doppler 2/2009, 8/2010 8/25/2010-heterogeneous irregular athresclerotic plaque in the R internal carotid artery.       H/O echocardiogram 11/07/2013    EF=>55%, normal LV systolic function, mild mitral regurg, severe mitral stenosis, severe pulmonary HTN, Component Value Date    CHOL 83 06/14/2018    TRIG 75 06/14/2018    HDL 42 06/14/2018    LDLCALC 40 12/07/2016    LDLDIRECT 39 06/14/2018     PT/INR:   Lab Results   Component Value Date    INR 1.04 05/29/2013        BMP:    Lab Results   Component Value Date     10/24/2018    K 4.5 10/24/2018    CL 96 (L) 10/24/2018    CO2 32 10/24/2018    BUN 77 (H) 10/24/2018     CMP:   Lab Results   Component Value Date    AST 17 10/22/2018    ALT 8 (L) 10/22/2018    PROT 5.4 (L) 10/22/2018    BILITOT 0.9 10/22/2018    ALKPHOS 64 10/22/2018     TSH:    Lab Results   Component Value Date    TSH 3.0 01/14/2014           IMPRESSION / RECOMMENDATIONS:     1. Atrial tachycardia vs Paroxysmal Atrial Fibrillation  2. Moderate to Severe Mitral Stenosis  3. Mild to Moderate Mitral Valve Regurgitation  4. S/P AVR   5. Hx of CABG  6. HTN  7. DM  8. Morbid obesity  9. Acute on chronic kidney disease      Patient with severe valvular disease - especially mitral valve disease  Increased pressures in left atrium probably the reason for her atrial arrhhythmia  Patient is in atrial tachycardia with RVR now and probably has atrial fibrillation too given her mitral valve severity  Symptoms predominantly from mitral valve disease and this will be very difficult situation. Patient is on Dopamine drip at this time for pressures. I would recommend to stop dopamine as it increases the risk of tachycardia and atrial arrhythmia higher and her symptoms probably will be worse in tachycardia. Increase midodrine instead for pressure. Rate needs to be controlled. Unfortunately with pressure we are having difficult. I want to see what happens with above changes. If BP is increased then may be we can add some B-blocker. I asked for CT surgery evaluation    Addendum : Discussed with July Vivar and he opined that she is very high risk and has severe calcification in the mitral valve area and he explained reasons why previous surgeons denied it.

## 2018-10-24 NOTE — CONSULTS
CARDIAC SURGERY      CORONARY ARTERY BYPASS GRAFT  4/2010    AVR @ [facility]    CORONARY ARTERY BYPASS GRAFT  4/2010    [facility]    JOINT REPLACEMENT Bilateral     knees    KNEE SURGERY      bilateral knee replacement       FAMILY HISTORY    Family History   Problem Relation Age of Onset    Cancer Mother     Heart Disease Father     High Blood Pressure Father        SOCIAL HISTORY    Social History   Substance Use Topics    Smoking status: Former Smoker     Packs/day: 2.00     Years: 25.00     Types: Cigarettes     Quit date: 1/1/1981    Smokeless tobacco: Never Used      Comment: reviewed 9/24/15    Alcohol use No      Comment: yadira       ALLERGIES    Allergies   Allergen Reactions    Lisinopril        MEDICATIONS    No current facility-administered medications on file prior to encounter.       Current Outpatient Prescriptions on File Prior to Encounter   Medication Sig Dispense Refill    OXYGEN Inhale 2 L into the lungs      montelukast (SINGULAIR) 10 MG tablet Take 10 mg by mouth nightly      Liraglutide (VICTOZA SC) Inject into the skin      cephALEXin (KEFLEX) 500 MG capsule TK ONE C PO  TID  0    budesonide (PULMICORT) 0.5 MG/2ML nebulizer suspension Take 2 mLs by nebulization 2 times daily Dx Code COPD J44.9 60 ampule 5    albuterol sulfate HFA (PROAIR HFA) 108 (90 Base) MCG/ACT inhaler Inhale 2 puffs into the lungs 4 times daily 1 Inhaler 5    ipratropium-albuterol (DUONEB) 0.5-2.5 (3) MG/3ML SOLN nebulizer solution USE 3 ML VIA NEBULIZER FOUR TIMES DAILY 3240 mL 5    torsemide (DEMADEX) 20 MG tablet TK 1 T PO  BID  3    metolazone (ZAROXOLYN) 2.5 MG tablet TK 1 T PO  EVERY OTHER DAY ONE HOUR BEFORE TORSEMIDE  3    allopurinol (ZYLOPRIM) 100 MG tablet TK 1 T PO  D  3    Cyanocobalamin (VITAMIN B 12 PO) Take 500 mcg by mouth daily       glimepiride (AMARYL) 2 MG tablet Take 2 mg by mouth every morning (before breakfast)      Respiratory Therapy Supplies (NEBULIZER AIR TUBE/PLUGS) MISC 1 Device Active Problem List   Diagnosis    Hypertension    Hyperlipidemia    Diabetes mellitus ([facility]    Hx of CABG    S/P AVR    CAD (coronary artery disease)    Pneumonia    Hypoxia    COPD with acute exacerbation (HCC)    CHF (congestive heart failure) (HCC)    Acute on chronic diastolic heart failure (HCC)    [facility]    YAYO (obstructive sleep apnea)    Acute on chronic congestive heart failure ([facility]    Rheumatic mitral stenosis    Coronary artery disease involving coronary bypass graft of native heart without angina pectoris    Chronic obstructive pulmonary disease ([facility]    Essential hypertension    DEMETRIUS (acute kidney injury) ([facility]       Measurements:  Wound 10/24/18 Right groin fold - MASD (moisture associated skin damage) (Active)   Wound Type Wound 10/24/2018 10:08 AM   Wound Other 10/24/2018 10:08 AM   Dressing Status Clean;Dry; Intact 10/24/2018 10:08 AM   Dressing Changed Changed/New 10/24/2018 10:08 AM   Wound Length (cm) 0.5 cm 10/24/2018 10:08 AM   Wound Width (cm) 6.5 cm 10/24/2018 10:08 AM   Wound Depth (cm)  0.1 10/24/2018 10:08 AM   Calculated Wound Size (cm^2) (l*w) 3.25 cm^2 10/24/2018 10:08 AM   Distance Tunneling (cm) 0 cm 10/24/2018 10:08 AM   Tunneling Position ___ O'Clock 0 10/24/2018 10:08 AM   Undermining Starts ___ O'Clock 0 10/24/2018 10:08 AM   Undermining Ends___ O'Clock 0 10/24/2018 10:08 AM   Undermining [place] Distance (cm) 0 10/24/2018 10:08 AM   Wound Assessment Red 10/24/2018 10:08 AM   Drainage Amount Scant 10/24/2018 10:08 AM   Drainage Description Serosanguinous 10/24/2018 10:08 AM   Odor None 10/24/2018 10:08 AM   Margins Defined edges 10/24/2018 10:08 AM   [place]-wound Assessment Intact 10/24/2018 10:08 AM   [place]%Wound Bed 0 10/24/2018 10:08 AM   Red%Wound Bed 100 10/24/2018 10:08 AM   Yellow%Wound Bed 0 10/24/2018 10:08 AM   Black%Wound Bed 0 10/24/2018 10:08 AM   Purple%Wound Bed 0 10/24/2018 10:08 AM   Other%Wound Bed 0 10/24/2018 10:08 AM   Number of days: 0       Wound 10/24/18 Left groin fold- MASD (moisture associated skin damage) (Active)   Wound Type Wound 10/24/2018 10:08 AM   Wound Other 10/24/2018 10:08 AM   Dressing Status Clean;Dry; Intact 10/24/2018 10:08 AM   Dressing Changed Changed/New 10/24/2018 10:08 AM   Dressing/Treatment Other (Comment) 10/24/2018 10:08 AM   Wound Length (cm) 1 cm 10/24/2018 10:08 AM   Wound Width (cm) 12 cm 10/24/2018 10:08 AM   Wound Depth (cm)  0.1 10/24/2018 10:08 AM   Calculated Wound Size (cm^2) (l*w) 12 cm^2 10/24/2018 10:08 AM   Distance Tunneling (cm) 0 cm 10/24/2018 10:08 AM   Tunneling Position ___ O'Clock 0 10/24/2018 10:08 AM   Undermining Starts ___ O'Clock 0 10/24/2018 10:08 AM   Undermining Ends___ O'Clock 0 10/24/2018 10:08 AM   Undermining [place] Distance (cm) 0 10/24/2018 10:08 AM   Wound Assessment Red 10/24/2018 10:08 AM   Drainage Amount Scant 10/24/2018 10:08 AM   Drainage Description Serosanguinous 10/24/2018 10:08 AM   Odor None 10/24/2018 10:08 AM   Margins Defined edges 10/24/2018 10:08 AM   [place]-wound Assessment Intact 10/24/2018 10:08 AM   [place]%Wound Bed 0 10/24/2018 10:08 AM   Red%Wound Bed 100 10/24/2018 10:08 AM   Yellow%Wound Bed 0 10/24/2018 10:08 AM   Black%Wound Bed 0 10/24/2018 10:08 AM   Purple%Wound Bed 00 10/24/2018 10:08 AM   Other%Wound Bed 0 10/24/2018 10:08 AM   Number of days: 0       Response to treatment:  Well tolerated by patient. Pain Assessment:  Severity:  Mild   Quality of pain: burning  Wound Pain Timing/Severity: with cleansing of groins  Premedicated: no    Plan:     Plan of Care: [REMOVED] Wound 10/23/18 Leg Left-Dressing/Treatment: Open to air  Wound 10/24/18 Right groin fold - MASD (moisture associated skin damage)-Dressing/Treatment:  (Interdry AG)  Wound 10/24/18 Left groin fold- MASD (moisture associated skin damage)-Dressing/Treatment: Other (Comment) (Interdry AG)  Pictures taken of groin fold wounds bilaterally.   Recommend

## 2018-10-24 NOTE — PROGRESS NOTES
ml           Physical Exam:  Constitutional:  Well developed, Well nourished, No acute distress, Non-toxic appearance. HENT:  Normocephalic, Atraumatic, Bilateral external ears normal, Oropharynx moist, No oral exudates, Nose normal. Neck- Normal range of motion, No tenderness, Supple, No stridor. Eyes:  PERRL, EOMI, Conjunctiva normal, No discharge. Respiratory:  Normal breath sounds, No respiratory distress, No wheezing, No chest tenderness. ,no use of accessory muscles, diaphragm movement is normal  Cardiovascular: (PMI) apex non displaced,no lifts no thrills, no s3,no s4, Normal heart rate, Normal rhythm, No murmurs, No rubs, No gallops. Carotid arteries pulse and amplitude are normal no bruit, no abdominal bruit noted ( normal abdominal aorta ausculation), femoral arteries pulse and amplitude are normal no bruit, pedal pulses are normal  GI:  Bowel sounds normal, Soft, No tenderness, No masses, No pulsatile masses. : External genitalia appear normal, No masses or lesions. No discharge. No CVA tenderness. Musculoskeletal:  Intact distal pulses, No edema, No tenderness, No cyanosis, No clubbing. Good range of motion in all major joints. No tenderness to palpation or major deformities noted. Back- No tenderness. Integument:  Warm, Dry, No erythema, No rash. Lymphatic:  No lymphadenopathy noted. Neurologic:  Alert & oriented x 3, Normal motor function, Normal sensory function, No focal deficits noted.    Psychiatric:  Affect normal, Judgment normal, Mood normal.     Medications:    midodrine  5 mg Oral TID WC    pneumococcal 13-valent conjugate  0.5 mL Intramuscular Once    albuterol sulfate HFA  2 puff Inhalation 4x Daily    aspirin  81 mg Oral Daily    budesonide  500 mcg Nebulization BID    [START ON 10/25/2018] vitamin D  50,000 Units Oral Once per day on Thu    simvastatin  20 mg Oral Nightly    pantoprazole  40 mg Oral QAM AC    montelukast  10 mg Oral Nightly    therapeutic lopressor, AFTER dopamine stops  5. CKD: as per nephrology  DM: stable, continue, insulinHealth maintenance: exerise and diet  All labs, medications and tests reviewed, continue all other medications of all above medical condition listed as is.       Tj Pittman MD 10/24/2018 10:38 AM

## 2018-10-25 NOTE — CONSULTS
Palliative Medicine Consultation    Reason for Consult:      __X__ Advance Care Planning--Full Code; I urged her to strongly consider DNR-CC-A (at most) with NO CPR, NO shocks, NO intubation  __X__Transition of Care Planning--ideally wants to return home, however, accepts she may need to go to [facility]  __X__ Psychosocial Support  __X__ Symptom Management--goals of care    Recommendations:    1. 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 I recommended DNR-CC-A as above for her; I assured her if she were on life support, her sons can authorize withdrawal at any time  3. She is absorbing the bad news that no surgery would benefit her, and that her condition is likely end-stage, however, prognosis still uncertain    Requesting Physician:  Dr. Michael Null:  Dyspnea    History Obtained From:  patient, electronic medical record    HISTORY OF PRESENT ILLNESS:    Mrs. Jennifer Ramirez has had COPD, CHF, valvular heart disease for years; she is s/p aortic valve replacement 2010. Her mitral valve is failing but she learned today (which did confirm earlier surgical opinions) that she's not a candidate for valve surgery now. This has come as a (not totally unexpected) emotional blow. I was asked to see patient to assist with goals of care and for code status discussion. Past Medical History:        Diagnosis Date    CAD (coronary artery disease)     COPD (chronic obstructive pulmonary disease) ([facility] [address]     Diabetes mellitus ([facility] [address]     Gallstones     H/O cardiovascular stress test 9/2007, 9/2009, 7/2010 7/6/2010-Rest ef is 67%. Global LV systolic function is normal. No ECG changes.  Unremarkable pharmacological stress test.    H/O complete electrocardiogram 5/13/2010    H/O Doppler ultrasound carotid doppler 2/2009, 8/2010 8/25/2010-heterogeneous irregular athresclerotic plaque in the R internal carotid tablet 20 mg, 20 mg, Oral, Nightly  pantoprazole (PROTONIX) tablet 40 mg, 40 mg, Oral, QAM AC  montelukast (SINGULAIR) tablet 10 mg, 10 mg, Oral, Nightly  therapeutic multivitamin-minerals 1 tablet, 1 tablet, Oral, Daily  metolazone (ZAROXOLYN) tablet 2.5 mg, 2.5 mg, Oral, Every Other Day  vitamin B-12 (CYANOCOBALAMIN) tablet 500 mcg, 500 mcg, Oral, Daily  ferrous sulfate tablet 325 mg, 325 mg, Oral, Daily with breakfast  ipratropium-albuterol (DUONEB) nebulizer solution 1 ampule, 1 ampule, Inhalation, Q4H PRN  sodium chloride flush 0.9 % injection 10 mL, 10 mL, Intravenous, 2 times per day  sodium chloride flush 0.9 % injection 10 mL, 10 mL, Intravenous, PRN  docusate sodium (COLACE) capsule 100 mg, 100 mg, Oral, BID  ondansetron (ZOFRAN) injection 4 mg, 4 mg, Intravenous, Q6H PRN  heparin (porcine) injection 5,000 Units, 5,000 Units, Subcutaneous, 3 times per day  insulin lispro (HUMALOG) injection vial 0-6 Units, 0-6 Units, Subcutaneous, TID WC  insulin lispro (HUMALOG) injection vial 0-3 Units, 0-3 Units, Subcutaneous, Nightly  glucose (GLUTOSE) 40 % oral gel 15 g, 15 g, Oral, PRN  dextrose 50 % solution 12.5 g, 12.5 g, Intravenous, PRN  glucagon (rDNA) injection 1 mg, 1 mg, Intramuscular, PRN  dextrose 5 % solution, 100 mL/hr, Intravenous, PRN    Allergies:  Lisinopril    Social History:    , 2 sons, ex-smoker x 35+ years, non-drinker    Family History:   Family History   Problem Relation Age of Onset    Cancer Mother     Heart Disease Father     High Blood Pressure Father        REVIEW OF SYSTEMS:    Besides dyspnea, generalized fatigue, she feels fairly good, but recognizes she has markedly declined over recent years    Vitals:    BP (!) 90/52   Pulse 122   Temp 97.7 °F (36.5 °C) (Oral)   Resp 18   Ht 5' (1.524 m)   Wt 211 lb 14.4 oz (96.1 kg)   SpO2 98%   BMI 41.38 kg/m²     PHYSICAL EXAM:    I met Mrs. Stephan Ingram in her hospital room, relaxing in bed, watching TV.    Gen:  WD mildly obese older

## 2018-10-25 NOTE — PROGRESS NOTES
Admit Date:  10/21/2018    Admission diagnosis / Complaint :  Atrial tachycardia      Subjective:  Ms. Berto Goldsmith is feeling the same. Still orthopnea, shortness of breath. Rajeev Arboleda discussed with her about her condition too      Objective:   BP (!) 94/50   Pulse 108   Temp 98.6 °F (37 °C) (Oral)   Resp 23   Ht 5' (1.524 m)   Wt 211 lb 14.4 oz (96.1 kg)   SpO2 94%   BMI 41.38 kg/m²     Intake/Output Summary (Last 24 hours) at 10/25/18 1556  Last data filed at 10/25/18 3924   Gross per 24 hour   Intake              719 ml   Output             1375 ml   Net             -656 ml       TELEMETRY: atrial tachycardia    has a past medical history of CAD (coronary artery disease); COPD (chronic obstructive pulmonary disease) ([facility] [address] Diabetes mellitus ([facility] [address] Gallstones; H/O cardiovascular stress test; H/O complete electrocardiogram; H/O Doppler ultrasound; H/O echocardiogram; H/O echocardiogram; H/O echocardiogram; Hx of CABG; Hyperlipidemia; Hypertension; Murmur; S/P AVR; and Type II or unspecified type diabetes mellitus without mention of complication, not stated as uncontrolled. has a past surgical history that includes knee surgery; Coronary artery bypass graft (4/2010); Coronary artery bypass graft (4/2010); Cardiac surgery; and joint replacement (Bilateral).   Physical Exam:  General:  Awake, alert, NAD  Skin:  Warm and dry  Neck:  JVD present  Chest:   Rales, poor air movement bilaterally  Cardiovascular:  RRR S1S2, tachycardic, systolic and diastolic murmur  Abdomen:  Soft non tender  Extremities:   Edema present    Medications:    torsemide  20 mg Oral BID    metoprolol tartrate  25 mg Oral BID    senna  1 tablet Oral Once    midodrine  10 mg Oral TID WC    pneumococcal 13-valent conjugate  0.5 mL Intramuscular Once    albuterol sulfate HFA  2 puff Inhalation 4x Daily    aspirin  81 mg Oral Daily    budesonide  500 mcg Nebulization BID    vitamin D  50,000 Units Oral Once per day on Thu    simvastatin  20

## 2018-10-25 NOTE — PROGRESS NOTES
[address] [place] [address] 30, [address] [address]  Phone: [phone]  Office Hours: 8:30AM - 4:30PM  Monday - Friday     Nephrology Progress Note  10/25/2018 7:57 AM  Subjective:   Admit Date: 10/21/2018  PCP: Dom Steiner MD  Interval History: edema better  soa at rest  Made a lot more urine  Has c line  Diet: DIET LOW SODIUM 2 GM; 1800 ml      Data:   Scheduled Meds:   senna  1 tablet Oral Once    midodrine  10 mg Oral TID     pneumococcal 13-valent conjugate  0.5 mL Intramuscular Once    albuterol sulfate HFA  2 puff Inhalation 4x Daily    aspirin  81 mg Oral Daily    budesonide  500 mcg Nebulization BID    vitamin D  50,000 Units Oral Once per day on Thu    simvastatin  20 mg Oral Nightly    pantoprazole  40 mg Oral QAM AC    montelukast  10 mg Oral Nightly    therapeutic multivitamin-minerals  1 tablet Oral Daily    metolazone  2.5 mg Oral Every Other Day    vitamin B-12  500 mcg Oral Daily    ferrous sulfate  325 mg Oral Daily with breakfast    sodium chloride flush  10 mL Intravenous 2 times per day    docusate sodium  100 mg Oral BID    heparin (porcine)  5,000 Units Subcutaneous 3 times per day    sodium polystyrene  15 g Oral Once    insulin lispro  0-6 Units Subcutaneous TID     insulin lispro  0-3 Units Subcutaneous Nightly     Continuous Infusions:   DOPamine 5 mcg/kg/min (10/24/18 1731)    furosemide (LASIX) 1mg/ml infusion 5 mg/hr (10/24/18 2117)    dextrose       PRN Meds:ipratropium-albuterol, sodium chloride flush, ondansetron, glucose, dextrose, glucagon (rDNA), dextrose  I/O last 3 completed shifts: In: 135 [P.O.:710; I.V.:119]  Out: 1375 [Urine:1375]  No intake/output data recorded.     Intake/Output Summary (Last 24 hours) at 10/25/18 9167  Last data filed at 10/25/18 0307   Gross per 24 hour   Intake              829 ml   Output             1375 ml   Net             -546 ml     CBC:   No results for input(s): WBC, HGB, PLT in the last 72

## 2018-10-25 NOTE — PROGRESS NOTES
Physical Exam:  Constitutional:  Well developed, Well nourished, No acute distress, Non-toxic appearance. HENT:  Normocephalic, Atraumatic, Bilateral external ears normal, Oropharynx moist, No oral exudates, Nose normal. Neck- Normal range of motion, No tenderness, Supple, No stridor. Eyes:  PERRL, EOMI, Conjunctiva normal, No discharge. Respiratory:  Normal breath sounds, No respiratory distress, No wheezing, No chest tenderness. ,no use of accessory muscles, diaphragm movement is normal  Cardiovascular: (PMI) apex non displaced,no lifts no thrills, no s3,no s4, Normal heart rate, Normal rhythm, No murmurs, No rubs, No gallops. Carotid arteries pulse and amplitude are normal no bruit, no abdominal bruit noted ( normal abdominal aorta ausculation), femoral arteries pulse and amplitude are normal no bruit, pedal pulses are normal  GI:  Bowel sounds normal, Soft, No tenderness, No masses, No pulsatile masses. : External genitalia appear normal, No masses or lesions. No discharge. No CVA tenderness. Musculoskeletal:  Intact distal pulses, No edema, No tenderness, No cyanosis, No clubbing. Good range of motion in all major joints. No tenderness to palpation or major deformities noted. Back- No tenderness. Integument:  Warm, Dry, No erythema, No rash. Lymphatic:  No lymphadenopathy noted. Neurologic:  Alert & oriented x 3, Normal motor function, Normal sensory function, No focal deficits noted.    Psychiatric:  Affect normal, Judgment normal, Mood normal.     Medications:    torsemide  20 mg Oral BID    metoprolol tartrate  25 mg Oral BID    senna  1 tablet Oral Once    midodrine  10 mg Oral TID WC    pneumococcal 13-valent conjugate  0.5 mL Intramuscular Once    albuterol sulfate HFA  2 puff Inhalation 4x Daily    aspirin  81 mg Oral Daily    budesonide  500 mcg Nebulization BID    vitamin D  50,000 Units Oral Once per day on Thu    simvastatin  20 mg Oral Nightly    pantoprazole  40 mg Oral QAM AC    montelukast  10 mg Oral Nightly    therapeutic multivitamin-minerals  1 tablet Oral Daily    metolazone  2.5 mg Oral Every Other Day    vitamin B-12  500 mcg Oral Daily    ferrous sulfate  325 mg Oral Daily with breakfast    sodium chloride flush  10 mL Intravenous 2 times per day    docusate sodium  100 mg Oral BID    heparin (porcine)  5,000 Units Subcutaneous 3 times per day    insulin lispro  0-6 Units Subcutaneous TID WC    insulin lispro  0-3 Units Subcutaneous Nightly      DOPamine 5 mcg/kg/min (10/24/18 8621)    dextrose       ipratropium-albuterol, sodium chloride flush, ondansetron, glucose, dextrose, glucagon (rDNA), dextrose    Lab Data:  CBC: No results for input(s): WBC, HGB, HCT, MCV, PLT in the last 72 hours. BMP:   Recent Labs      10/23/18   1155  10/24/18   0625  10/25/18   0550   NA  142  137  143   K  4.4  4.5  4.5   CL  96*  96*  99   CO2  31  32  32   PHOS  3.2  2.9  3.2   BUN  79*  77*  79*   CREATININE  2.5*  2.6*  2.6*     LIVER PROFILE:   No results for input(s): AST, ALT, LIPASE, BILIDIR, BILITOT, ALKPHOS in the last 72 hours. Invalid input(s): AMYLASE,  ALB  PT/INR: No results for input(s): PROTIME, INR in the last 72 hours. APTT: No results for input(s): APTT in the last 72 hours. BNP:  No results for input(s): BNP in the last 72 hours. TROPONIN: @TROPONINI:3@      Assessment:  [de-identified] y. o.year old who is admitted for          Plan:  CAD:history of CABG( [facility]A TO LAD, 2010 AT [facility]), Continue aspirin ADD STATINS  SEVERE PULMONARY HTN AND MITRAL STENOSIS: echo is reviewed, CT sx is called back again  1. CAD: Continue aspirin and statins and BB        2. Paroxysmal afib with tachycardia: possible new onset, will get EP for management and anticoagulation, D/C DOPAMINE  3. Dyslipidemia: continue statins  4. HTN: stable, continue lopressor, AFTER dopamine stops  5.  CKD: as per nephrology  DM: stable, continue, insulinHealth maintenance: exerise and diet  All labs, medications and tests reviewed, continue all other medications of all above medical condition listed as is.       Gabi Jackson MD 10/25/2018 3:08 PM

## 2018-10-26 NOTE — PROGRESS NOTES
Palliative Care  I see patient for symptom management and goals of care related to renal failure, valvular cardiac disease; no visitors. She is relaxing in bed watching TV. She is in good mood; slept relatively well last night (though had frequent interruptions). Heart: RRR, Lungs: CTA in front, her breathing seems less labored today. Likely to be d/c'd soon? Continue maximal medical treatment. Patient was seen with total face to face time of 15 minutes. More than 50% of this visit was counseling and education regarding palliative care as well as counseling on preventative health maintenance follow-up.   Electronically signed by Kaylin Wilson DO on 10/26/2018 at 10:13 AM

## 2018-10-26 NOTE — PROGRESS NOTES
of [facility] so we can set up a meeting. 106 PM    Spoke with Олег Sauer [facility]) He will be here on Sunday to discuss the goals of care. Diet DIET LOW SODIUM 2 GM; 1800 ml   DVT Prophylaxis [] Lovenox, [x]  Heparin, [] SCDs, [] Ambulation   GI Prophylaxis [x] PPI,  [] H2 Blocker,  [] Carafate,  [] Diet/Tube Feeds   Code Status Full Code   Disposition Patient requires continued admission due to Respiratory failure    MDM [] Low, [x] Moderate,[]  High     History of Present Illness:     Chief Complaint: DEMETRIUS (acute kidney injury) ([facility] [address]    Seen and examined today. Still with SOB and palpitations. Not getting out of bed. Ten point ROS reviewed negative, unless as noted above    Objective: Intake/Output Summary (Last 24 hours) at 10/26/18 1058  Last data filed at 10/26/18 0858   Gross per 24 hour   Intake              490 ml   Output              800 ml   Net             -310 ml      Vitals:   Vitals:    10/26/18 0916   BP: 90/71   Pulse: 120   Resp: 16   Temp:    SpO2:      Physical Exam:   GEN Awake female, sitting upright in bed in no apparent distress. Appears given age. EYES Pupils are equally round. No scleral erythema, discharge, or conjunctivitis. HENT Mucous membranes are moist. Oral pharynx without exudates, no evidence of thrush. NECK Supple, no apparent thyromegaly or masses. RESP Decreased breath sounds. No wheezes. No crackles. CARDIO/VASC S1/S2 auscultated. Tachycardic. No JVD or carotid bruits. Peripheral pulses equal bilaterally and palpable. Bilateral edema. Erythema on both feet. No warmth or tenderness. GI Abdomen is soft without significant tenderness, or guarding. 5 a 7 cm mass on the umbilical area. Bowel sounds are normoactive. Rectal exam deferred. HEME/LYMPH No palpable cervical lymphadenopathy and no hepatosplenomegaly. No petechiae or ecchymoses. MSK No gross joint deformities. SKIN Normal coloration, warm, dry.   NEURO Cranial nerves appear grossly intact, normal speech, no lateralizing weakness. PSYCH Awake, alert, oriented x 4. Affect appropriate. Medications:   Medications:    magnesium sulfate  2 g Intravenous Once    torsemide  20 mg Oral BID    metoprolol tartrate  25 mg Oral BID    senna  1 tablet Oral Once    midodrine  10 mg Oral TID WC    pneumococcal 13-valent conjugate  0.5 mL Intramuscular Once    albuterol sulfate HFA  2 puff Inhalation 4x Daily    aspirin  81 mg Oral Daily    budesonide  500 mcg Nebulization BID    vitamin D  50,000 Units Oral Once per day on Thu    simvastatin  20 mg Oral Nightly    pantoprazole  40 mg Oral QAM AC    montelukast  10 mg Oral Nightly    therapeutic multivitamin-minerals  1 tablet Oral Daily    metolazone  2.5 mg Oral Every Other Day    vitamin B-12  500 mcg Oral Daily    ferrous sulfate  325 mg Oral Daily with breakfast    sodium chloride flush  10 mL Intravenous 2 times per day    docusate sodium  100 mg Oral BID    heparin (porcine)  5,000 Units Subcutaneous 3 times per day    insulin lispro  0-6 Units Subcutaneous TID     insulin lispro  0-3 Units Subcutaneous Nightly      Infusions:    dextrose       PRN Meds:     ipratropium-albuterol 1 ampule Q4H PRN   sodium chloride flush 10 mL PRN   ondansetron 4 mg Q6H PRN   glucose 15 g PRN   dextrose 12.5 g PRN   glucagon (rDNA) 1 mg PRN   dextrose 100 mL/hr PRN       No results for input(s): WBC, HGB, HCT, PLT in the last 72 hours.    Recent Labs      10/24/18   0625  10/25/18   0550  10/26/18   0555   NA  137  143  140   K  4.5  4.5  4.3   CL  96*  99  96*   CO2  32  32  32   PHOS  2.9  3.2  3.6   BUN  77*  79*  79*   CREATININE  2.6*  2.6*  2.8*     Imaging reviewed    Electronically signed by Adriane Leary MD on 10/26/2018 at 10:58 AM

## 2018-10-26 NOTE — PROGRESS NOTES
Nutrition Assessment    Type and Reason for Visit: Initial (los 5)    Malnutrition Assessment:  · Malnutrition Status: No malnutrition    Nutrition Diagnosis:   · Problem: No nutrition diagnosis at this time    Nutrition Assessment:  · Subjective Assessment: Adequate intake on 2 gm Sodium Diet, consuming 76% to all of meals. No reported wt loss PTA, wt appears stable over past 10 months per Epic history, BMI 41.6. Nutrition Risk Level   Risk Level: Low    Nutrition Intervention  Food and/or Delivery: Continue current diet, ONS not indicated  Nutrition Education/Counseling/Coordination of Care:  Continued Inpatient Monitoring, Coordination of Care, Education not appropriate at this time    Patient assessed for nutrition risk. Deemed to be at low risk at this time. Will continue to follow patient.       Electronically signed by Tessa Portillo RD, LD on 10/26/18 at 4:09 PM    Contact Number: [phone]

## 2018-10-26 NOTE — PROGRESS NOTES
[address] [place] [address] 04, [address] [address]  Phone: [phone]  Office Hours: 8:30AM - 4:30PM  Monday - Friday     Nephrology Progress Note  10/26/2018 4:12 PM  Subjective:   Admit Date: 10/21/2018  PCP: General Mendoza MD  Interval History: soa better  Comfortable   Watching TV      Diet: DIET LOW SODIUM 2 GM; 1800 ml      Data:   Scheduled Meds:   torsemide  20 mg Oral BID    metoprolol tartrate  25 mg Oral BID    senna  1 tablet Oral Once    midodrine  10 mg Oral TID WC    pneumococcal 13-valent conjugate  0.5 mL Intramuscular Once    albuterol sulfate HFA  2 puff Inhalation 4x Daily    aspirin  81 mg Oral Daily    budesonide  500 mcg Nebulization BID    vitamin D  50,000 Units Oral Once per day on Thu    simvastatin  20 mg Oral Nightly    pantoprazole  40 mg Oral QAM AC    montelukast  10 mg Oral Nightly    therapeutic multivitamin-minerals  1 tablet Oral Daily    metolazone  2.5 mg Oral Every Other Day    vitamin B-12  500 mcg Oral Daily    ferrous sulfate  325 mg Oral Daily with breakfast    sodium chloride flush  10 mL Intravenous 2 times per day    docusate sodium  100 mg Oral BID    heparin (porcine)  5,000 Units Subcutaneous 3 times per day    insulin lispro  0-6 Units Subcutaneous TID     insulin lispro  0-3 Units Subcutaneous Nightly     Continuous Infusions:   dextrose       PRN Meds:ipratropium-albuterol, sodium chloride flush, ondansetron, glucose, dextrose, glucagon (rDNA), dextrose  I/O last 3 completed shifts: In: 65 [P.O.:840; I.V.:10]  Out: 800 [Urine:800]  No intake/output data recorded. Intake/Output Summary (Last 24 hours) at 10/26/18 1612  Last data filed at 10/26/18 1226   Gross per 24 hour   Intake              850 ml   Output              550 ml   Net              300 ml     CBC:   No results for input(s): WBC, HGB, PLT in the last 72 hours.   BMP:    Recent Labs      10/24/18   0625  10/25/18   0550  10/26/18   0555   NA  137  143  140 K  4.5  4.5  4.3   CL  96*  99  96*   CO2  32  32  32   BUN  77*  79*  79*   CREATININE  2.6*  2.6*  2.8*   GLUCOSE  170*  155*  134*     Hepatic:   No results for input(s): AST, ALT, ALB, BILITOT, ALKPHOS in the last 72 hours. Troponin: No results for input(s): TROPONINI in the last 72 hours. BNP: No results for input(s): BNP in the last 72 hours. Lipids: No results for input(s): CHOL, HDL in the last 72 hours. Invalid input(s): LDLCALCU  ABGs: No results found for: PHART, PO2ART, IZY5PCP  INR: No results for input(s): INR in the last 72 hours. Objective:   Vitals: /62   Pulse 75   Temp 97.6 °F (36.4 °C) (Oral)   Resp 21   Ht 5' (1.524 m)   Wt 212 lb 9.6 oz (96.4 kg)   SpO2 95%   BMI 41.52 kg/m²   General appearance: alert and cooperative with exam  HEENT: Head: Normocephalic, no lesions, without obvious abnormality.   Neck: no adenopathy, no carotid bruit, no JVD, supple, symmetrical, trachea midline and thyroid not enlarged, symmetric, no tenderness/mass/nodules  Lungs: diminished breath sounds bilaterally and rhonchi bilaterally  Heart: regularly irregular rhythm  Abdomen: soft, non-tender; bowel sounds normal; no masses,  no organomegaly and obese  Extremities: edema 1-2 plus worse on rt - toes dusky blue  Neurologic: Mental status: Alert, oriented, thought content appropriate    Assessment and Plan:     Patient Active Problem List:     Hypertension     Hyperlipidemia     Diabetes mellitus ([facility] [address]     Hx of CABG     S/P AVR     CAD (coronary artery disease)     Pneumonia     Hypoxia     COPD with acute exacerbation (HCC)     CHF (congestive heart failure) (HCC)     Acute on chronic diastolic heart failure (HCC)     COPD (chronic obstructive pulmonary disease) (HCC)     YAYO (obstructive sleep apnea)     Acute on chronic congestive heart failure (HCC)     Rheumatic mitral stenosis     Coronary artery disease involving coronary bypass graft of native heart without angina pectoris     Chronic

## 2018-10-26 NOTE — PROGRESS NOTES
Patient awake and alert sitting in bed eating breakfast and watching T.V. Patient has call light in lap, bed alarm on and bedside table in front of patient. Will continue to monitor patient.  Electronically signed by Cynthia Lopez RN on 10/26/2018 at 10:44 AM

## 2018-10-27 NOTE — PROGRESS NOTES
Hospitalist Progress Note      Name:  Marifer Dangelo /Age/Sex: 1940  ([de-identified] y.o. female)   MRN & CSN:  [id number] & [id number] Admission Date/Time: 10/21/2018  6:01 PM   Location:  -A PCP: General Donaldson [employer] [address] Day: 7    Assessment and Plan:   Mraifer Dangelo is a [de-identified] y.o.  female  who presents with DEMETRIUS (acute kidney injury) ([facility] [address]    1. Acute respiratory failure due to CHF exacerbation still requiring oxygen support. 2. Acute on Chronic combined systolic and diastolic heart failure: Was on Lasix infusion, now on Torsemide and metolazone. Was on dopamine infusion, now discontinued. Cardiology following. 3. Paroxysmal atrial fibrillation with rapid ventricular response: now controlled. Could be new onset. EP on consult. 4. Shortness of breath: Echocardiogram.  Continue Lasix for now. 5. Coronary artery disease: Continue aspirin and statin. 6. Status post aortic valve replacement  7. Severe pulmonary hypertension and mitral stenosis. :  Cardiothoracic surgery consult. 8. Acute kidney injury: Could be cardiorenal versus ATN. Given albumin with low dose Lasix. Creatinine 2.8  9. Hypomagnesemia: replace accordingly  10. Hypertension: We'll continue beta blocker after discontinuing dopamine. Now on Metoprolol  11. Type 2 diabetes: Insulin sliding scale. Hypoglycemia protocol. 12. Hyperlipidemia: Continue Lipitor. 13. Compression fracture T6: Has history of fall 2 months ago. For MRI once stable. 14. COPD: Continue nebulization. Continue Singulair. 15. Peripheral artery disease. Continue ASA and statin     I have a long conversation with the patient. Explained to her the current clinical status and what we are doing. Explained that multiple organs are failing including the heart, kidney and lungs. She understands that she is not a surgical candidate and she has to make a decision moving forward.  She will keep her status as full code for now until she talks to her [address] [place] [address] [employer]

## 2018-10-27 NOTE — PROGRESS NOTES
Today's plan: continue present care    Admit Date:  10/21/2018    Subjective: not improving      Chief complaints on admission  Chief Complaint   Patient presents with    Shortness of Breath         History of present illness:Nicole is a [de-identified] y. o.year old who  presents with had concerns including Shortness of Breath. Past medical history:    has a past medical history of CAD (coronary artery disease); COPD (chronic obstructive pulmonary disease) ([facility] [address] [facility] Diabetes mellitus ([facility] [address] Gallstones; H/O cardiovascular stress test; H/O complete electrocardiogram; H/O Doppler ultrasound; H/O echocardiogram; H/O echocardiogram; H/O echocardiogram; Hx of CABG; Hyperlipidemia; Hypertension; Murmur; S/P AVR; and Type II or unspecified type diabetes mellitus without mention of complication, not stated as uncontrolled. Past surgical history:   has a past surgical history that includes knee surgery; Coronary artery bypass graft (4/2010); Coronary artery bypass graft (4/2010); Cardiac surgery; and joint replacement (Bilateral). Social History:   reports that she quit smoking about 37 years ago. Her smoking use included Cigarettes. She has a 50.00 pack-year smoking history. She has never used smokeless tobacco. She reports that she does not drink alcohol or use drugs. Family history:  family history includes Cancer in her mother; Heart Disease in her father; High Blood Pressure in her father. Allergies   Allergen Reactions    Lisinopril          Objective:   BP (!) 106/52   Pulse 79   Temp 98.1 °F (36.7 °C) (Oral)   Resp 28   Ht 5' (1.524 m)   Wt 213 lb 14.4 oz (97 kg)   SpO2 94%   BMI 41.77 kg/m²       Intake/Output Summary (Last 24 hours) at 10/27/18 1438  Last data filed at 10/27/18 1311   Gross per 24 hour   Intake              970 ml   Output             1900 ml   Net             -930 ml           Physical Exam:  Constitutional:  Well developed, Well nourished, No acute distress, Non-toxic appearance.    HENT:

## 2018-10-27 NOTE — PROGRESS NOTES
[address] [place] [address], [address] [place] [address]  Phone: [phone]  Office Hours: 8:30AM - 4:30PM  Monday - Friday     Nephrology Progress Note  10/27/2018 12:51 PM  Subjective:   Admit Date: 10/21/2018  PCP: Mera Pierson MD  Interval History: Comfortable   Watching TV  Made more urine       Diet: DIET LOW SODIUM 2 GM; 1800 ml      Data:   Scheduled Meds:   magnesium sulfate  4 g Intravenous Once    torsemide  20 mg Oral BID    metoprolol tartrate  25 mg Oral BID    senna  1 tablet Oral Once    midodrine  10 mg Oral TID WC    pneumococcal 13-valent conjugate  0.5 mL Intramuscular Once    albuterol sulfate HFA  2 puff Inhalation 4x Daily    aspirin  81 mg Oral Daily    budesonide  500 mcg Nebulization BID    vitamin D  50,000 Units Oral Once per day on Thu    simvastatin  20 mg Oral Nightly    pantoprazole  40 mg Oral QAM AC    montelukast  10 mg Oral Nightly    therapeutic multivitamin-minerals  1 tablet Oral Daily    metolazone  2.5 mg Oral Every Other Day    vitamin B-12  500 mcg Oral Daily    ferrous sulfate  325 mg Oral Daily with breakfast    sodium chloride flush  10 mL Intravenous 2 times per day    docusate sodium  100 mg Oral BID    heparin (porcine)  5,000 Units Subcutaneous 3 times per day    insulin lispro  0-6 Units Subcutaneous TID     insulin lispro  0-3 Units Subcutaneous Nightly     Continuous Infusions:   dextrose       PRN Meds:magnesium sulfate, ipratropium-albuterol, sodium chloride flush, ondansetron, glucose, dextrose, glucagon (rDNA), dextrose  I/O last 3 completed shifts: In: 1 [P.O.:960; I.V.:10]  Out: 1900 [Urine:1900]  I/O this shift:  In: 250 [P.O.:240;  I.V.:10]  Out: 150 [Urine:150]    Intake/Output Summary (Last 24 hours) at 10/27/18 1251  Last data filed at 10/27/18 0944   Gross per 24 hour   Intake              730 ml   Output             1900 ml   Net            -1170 ml     CBC:   Recent Labs      10/26/18   1630   WBC  9.5   HGB 10.3*   PLT  102*     BMP:    Recent Labs      10/25/18   0550  10/26/18   0555  10/27/18   0635   NA  143  140  143   K  4.5  4.3  4.0   CL  99  96*  97*   CO2  32  32  33*   BUN  79*  79*  79*   CREATININE  2.6*  2.8*  2.8*   GLUCOSE  155*  134*  117*     Hepatic:   No results for input(s): AST, ALT, ALB, BILITOT, ALKPHOS in the last 72 hours. Troponin: No results for input(s): TROPONINI in the last 72 hours. BNP: No results for input(s): BNP in the last 72 hours. Lipids: No results for input(s): CHOL, HDL in the last 72 hours. Invalid input(s): LDLCALCU  ABGs: No results found for: PHART, PO2ART, UBS7EGA  INR: No results for input(s): INR in the last 72 hours. Objective:   Vitals: BP (!) 105/54   Pulse 94   Temp 98.1 °F (36.7 °C) (Oral)   Resp 25   Ht 5' (1.524 m)   Wt 213 lb 14.4 oz (97 kg)   SpO2 97%   BMI 41.77 kg/m²   General appearance: alert and cooperative with exam  HEENT: Head: Normocephalic, no lesions, without obvious abnormality.   Neck: no adenopathy, no carotid bruit, no JVD, supple, symmetrical, trachea midline and thyroid not enlarged, symmetric, no tenderness/mass/nodules  Lungs: diminished breath sounds bilaterally and rhonchi bilaterally  Heart: regularly irregular rhythm  Abdomen: soft, non-tender; bowel sounds normal; no masses,  no organomegaly and obese  Extremities: edema 1-2 plus worse on rt - toes dusky blue improving  Neurologic: Mental status: Alert, oriented, thought content appropriate    Assessment and Plan:     Patient Active Problem List:     Hypertension     Hyperlipidemia     Diabetes mellitus ([facility] [address]     Hx of CABG     S/P AVR     CAD (coronary artery disease)     Pneumonia     Hypoxia     COPD with acute exacerbation (HCC)     CHF (congestive heart failure) (HCC)     Acute on chronic diastolic heart failure (HCC)     COPD (chronic obstructive pulmonary disease) (HCC)     YAYO (obstructive sleep apnea)     Acute on chronic congestive heart failure ([facility] [address]

## 2018-10-28 NOTE — PROGRESS NOTES
[facility] [address], [address] [place] [address]  Phone: [phone]  Office Hours: 8:30AM - 4:30PM  Monday - Friday     Nephrology Progress Note  10/28/2018 10:20 AM  Subjective:   Admit Date: 10/21/2018  PCP: Keaynna Peñaloza MD  Interval History: Comfortable   Making urine  Has not made a decision reg hospice  Going to rehab/SNF      Diet: DIET LOW SODIUM 2 GM; 1800 ml      Data:   Scheduled Meds:   torsemide  20 mg Oral BID    metoprolol tartrate  25 mg Oral BID    senna  1 tablet Oral Once    midodrine  10 mg Oral TID WC    pneumococcal 13-valent conjugate  0.5 mL Intramuscular Once    albuterol sulfate HFA  2 puff Inhalation 4x Daily    aspirin  81 mg Oral Daily    budesonide  500 mcg Nebulization BID    vitamin D  50,000 Units Oral Once per day on Thu    simvastatin  20 mg Oral Nightly    pantoprazole  40 mg Oral QAM AC    montelukast  10 mg Oral Nightly    therapeutic multivitamin-minerals  1 tablet Oral Daily    metolazone  2.5 mg Oral Every Other Day    vitamin B-12  500 mcg Oral Daily    ferrous sulfate  325 mg Oral Daily with breakfast    sodium chloride flush  10 mL Intravenous 2 times per day    docusate sodium  100 mg Oral BID    heparin (porcine)  5,000 Units Subcutaneous 3 times per day    insulin lispro  0-6 Units Subcutaneous TID     insulin lispro  0-3 Units Subcutaneous Nightly     Continuous Infusions:   dextrose       PRN Meds:magnesium sulfate, ipratropium-albuterol, sodium chloride flush, ondansetron, glucose, dextrose, glucagon (rDNA), dextrose  I/O last 3 completed shifts: In: 36 [P.O.:720; I.V.:10]  Out: 1450 [Urine:1450]  No intake/output data recorded.     Intake/Output Summary (Last 24 hours) at 10/28/18 1020  Last data filed at 10/28/18 0328   Gross per 24 hour   Intake              480 ml   Output             1300 ml   Net             -820 ml     CBC:   Recent Labs      10/26/18   1630   WBC  9.5   HGB  10.3*   PLT  102*     BMP:    Recent

## 2018-10-28 NOTE — PROGRESS NOTES
and Renaye Duane). Palliative care on consult. Spoke with Renaye Duane [facility]) He will be here on today to discuss the goals of care. Diet DIET LOW SODIUM 2 GM; 1800 ml   DVT Prophylaxis [] Lovenox, [x]  Heparin, [] SCDs, [] Ambulation   GI Prophylaxis [x] PPI,  [] H2 Blocker,  [] Carafate,  [] Diet/Tube Feeds   Code Status Full Code   Disposition Patient requires continued admission due to Respiratory failure    MDM [] Low, [x] Moderate,[]  High     History of Present Illness:     Chief Complaint: DEMETRIUS (acute kidney injury) ([facility] [address]    Seen and examined today. SOB is better. No palpitation. Rate controlled on Telemetry. Still with borderline hypotension  Ten point ROS reviewed negative, unless as noted above    Objective: Intake/Output Summary (Last 24 hours) at 10/28/18 1012  Last data filed at 10/28/18 0328   Gross per 24 hour   Intake              480 ml   Output             1300 ml   Net             -820 ml      Vitals:   Vitals:    10/28/18 0907   BP: (!) 95/57   Pulse: 77   Resp:    Temp:    SpO2:      Physical Exam:   GEN Awake female, sitting upright in bed in no apparent distress. Appears given age. EYES Pupils are equally round. No scleral erythema, discharge, or conjunctivitis. HENT Mucous membranes are moist. Oral pharynx without exudates, no evidence of thrush. NECK Supple, no apparent thyromegaly or masses. RESP Decreased breath sounds. No wheezes. No crackles. CARDIO/VASC S1/S2 auscultated. Tachycardic. No JVD or carotid bruits. Peripheral pulses equal bilaterally and palpable. Bilateral edema. Erythema on both feet. No warmth or tenderness. GI Abdomen is soft without significant tenderness, or guarding. 5 a 7 cm mass on the umbilical area. Bowel sounds are normoactive. Rectal exam deferred. HEME/LYMPH No palpable cervical lymphadenopathy and no hepatosplenomegaly. No petechiae or ecchymoses. MSK No gross joint deformities. SKIN Normal coloration, warm, dry.   NEURO Cranial

## 2018-10-29 NOTE — PROGRESS NOTES
carotid bruits. Peripheral pulses equal bilaterally and palpable. Bilateral edema. Erythema on both feet. No warmth or tenderness. GI Abdomen is soft without significant tenderness, or guarding. 5 a 7 cm mass on the umbilical area. Bowel sounds are normoactive. Rectal exam deferred. HEME/LYMPH No palpable cervical lymphadenopathy and no hepatosplenomegaly. No petechiae or ecchymoses. MSK No gross joint deformities. SKIN Normal coloration, warm, dry. NEURO Cranial nerves appear grossly intact, normal speech, no lateralizing weakness. PSYCH Awake, alert, oriented x 4. Affect appropriate.     Medications:   Medications:    magnesium sulfate  1 g Intravenous Once    torsemide  20 mg Oral BID    metoprolol tartrate  25 mg Oral BID    senna  1 tablet Oral Once    midodrine  10 mg Oral TID WC    pneumococcal 13-valent conjugate  0.5 mL Intramuscular Once    albuterol sulfate HFA  2 puff Inhalation 4x Daily    aspirin  81 mg Oral Daily    budesonide  500 mcg Nebulization BID    vitamin D  50,000 Units Oral Once per day on Thu    simvastatin  20 mg Oral Nightly    pantoprazole  40 mg Oral QAM AC    montelukast  10 mg Oral Nightly    therapeutic multivitamin-minerals  1 tablet Oral Daily    metolazone  2.5 mg Oral Every Other Day    vitamin B-12  500 mcg Oral Daily    ferrous sulfate  325 mg Oral Daily with breakfast    sodium chloride flush  10 mL Intravenous 2 times per day    docusate sodium  100 mg Oral BID    heparin (porcine)  5,000 Units Subcutaneous 3 times per day    insulin lispro  0-6 Units Subcutaneous TID     insulin lispro  0-3 Units Subcutaneous Nightly      Infusions:    dextrose       PRN Meds:     bisacodyl 10 mg Daily PRN   magnesium sulfate 1 g PRN   ipratropium-albuterol 1 ampule Q4H PRN   sodium chloride flush 10 mL PRN   ondansetron 4 mg Q6H PRN   glucose 15 g PRN   dextrose 12.5 g PRN   glucagon (rDNA) 1 mg PRN   dextrose 100 mL/hr PRN       Recent Labs 10/26/18   1630   WBC  9.5   HGB  10.3*   HCT  32.5*   PLT  102*      Recent Labs      10/27/18   0635  10/28/18   0655  10/29/18   0520   NA  143  141  141   K  4.0  3.7  3.6   CL  97*  95*  95*   CO2  33*  34*  35*   PHOS  3.6  3.8  3.6   BUN  79*  77*  73*   CREATININE  2.8*  2.6*  2.5*     Imaging reviewed    Electronically signed by Harry Lainez MD on 10/29/2018 at 10:35 AM

## 2018-10-29 NOTE — DISCHARGE INSTR - COC
failure ([facility]) I50.33    COPD (chronic obstructive pulmonary disease) ([facility]) J44.9    YAYO (obstructive sleep apnea) G47.33    Acute on chronic congestive heart failure ([facility]) I50.9    Rheumatic mitral stenosis I05.0    Coronary artery disease involving coronary bypass graft of native heart without angina pectoris I25.810    Chronic obstructive pulmonary disease ([facility]) J44.9    Essential hypertension I10    DEMETRIUS (acute kidney injury) ([facility]) N17.9    PAT (paroxysmal atrial tachycardia) ([facility]) I47.1    Dyspnea R06.00       Isolation/Infection:   Isolation          No Isolation            Nurse Assessment:  Last Vital Signs: /61   Pulse 92   Temp 97.5 °F (36.4 °C) (Oral)   Resp 25   Ht 5' (1.524 m)   Wt 219 lb 12.8 oz (99.7 kg)   SpO2 91%   BMI 42.93 kg/m²     Last documented pain score (0-10 scale): Pain Level: 0  Last Weight:   Wt Readings from Last 1 Encounters:   10/29/18 219 lb 12.8 oz (99.7 kg)     Mental Status:  oriented and alert    IV Access:  - None    Nursing Mobility/ADLs:  Walking   Assisted  Transfer  Assisted  Bathing  Assisted  Dressing  Assisted  Toileting  Assisted  Feeding  Independent  Med Admin  Assisted  Med Delivery   whole    Wound Care Documentation and Therapy:  Wound 10/24/18 Right groin fold - MASD (moisture associated skin damage) (Active)   Wound Type Wound 10/29/2018  8:02 AM   Wound Other 10/24/2018 10:08 AM   Dressing Status Clean;Dry; Intact 10/29/2018  8:02 AM   Dressing Changed Changed/New 10/24/2018 10:08 AM   Dressing/Treatment Other (Comment) 10/29/2018  8:02 AM   Wound Length (cm) 0.5 cm 10/24/2018 10:08 AM   Wound Width (cm) 6.5 cm 10/24/2018 10:08 AM   Wound Depth (cm)  0.1 10/24/2018 10:08 AM   Calculated Wound Size (cm^2) (l*w) 3.25 cm^2 10/24/2018 10:08 AM   Distance Tunneling (cm) 0 cm 10/24/2018 10:08 AM   Tunneling Position ___ O'Clock 0 10/24/2018 10:08 AM   Undermining Starts ___ O'Clock 0 10/24/2018 10:08 AM   Undermining Ends___ O'Clock 0 10/24/2018 10:08 AM   Undermining Maxium Distance (cm) 0 10/24/2018 10:08 AM   Wound Assessment Red 10/24/2018 10:08 AM   Drainage Amount Scant 10/24/2018 10:08 AM   Drainage Description Serosanguinous 10/24/2018 10:08 AM   Odor None 10/24/2018 10:08 AM   Margins Defined edges 10/24/2018 10:08 AM   Yumiko-wound Assessment Intact 10/24/2018 10:08 AM   Jenison%Wound Bed 0 10/24/2018 10:08 AM   Red%Wound Bed 100 10/24/2018 10:08 AM   Yellow%Wound Bed 0 10/24/2018 10:08 AM   Black%Wound Bed 0 10/24/2018 10:08 AM   Purple%Wound Bed 0 10/24/2018 10:08 AM   Other%Wound Bed 0 10/24/2018 10:08 AM   Number of days: 5       Wound 10/24/18 Left groin fold- MASD (moisture associated skin damage) (Active)   Wound Type Wound 10/29/2018  8:02 AM   Wound Other 10/24/2018 10:08 AM   Dressing Status Clean;Dry; Intact 10/29/2018  8:02 AM   Dressing Changed Changed/New 10/24/2018 10:08 AM   Dressing/Treatment Other (Comment) 10/29/2018  8:02 AM   Wound Length (cm) 1 cm 10/24/2018 10:08 AM   Wound Width (cm) 12 cm 10/24/2018 10:08 AM   Wound Depth (cm)  0.1 10/24/2018 10:08 AM   Calculated Wound Size (cm^2) (l*w) 12 cm^2 10/24/2018 10:08 AM   Distance Tunneling (cm) 0 cm 10/24/2018 10:08 AM   Tunneling Position ___ O'Clock 0 10/24/2018 10:08 AM   Undermining Starts ___ O'Clock 0 10/24/2018 10:08 AM   Undermining Ends___ O'Clock 0 10/24/2018 10:08 AM   Undermining Maxium Distance (cm) 0 10/24/2018 10:08 AM   Wound Assessment Red 10/24/2018 10:08 AM   Drainage Amount Scant 10/24/2018 10:08 AM   Drainage Description Serosanguinous 10/24/2018 10:08 AM   Odor None 10/24/2018 10:08 AM   Margins Defined edges 10/24/2018 10:08 AM   Yumiko-wound Assessment Intact 10/24/2018 10:08 AM   Jenison%Wound Bed 0 10/24/2018 10:08 AM   Red%Wound Bed 100 10/24/2018 10:08 AM   Yellow%Wound Bed 0 10/24/2018 10:08 AM   Black%Wound Bed 0 10/24/2018 10:08 AM   Purple%Wound Bed 00 10/24/2018 10:08 AM   Other%Wound Bed 0 10/24/2018 10:08 AM   Number of days: 5

## 2018-10-29 NOTE — PROGRESS NOTES
therapeutic multivitamin-minerals  1 tablet Oral Daily    metolazone  2.5 mg Oral Every Other Day    vitamin B-12  500 mcg Oral Daily    ferrous sulfate  325 mg Oral Daily with breakfast    sodium chloride flush  10 mL Intravenous 2 times per day    docusate sodium  100 mg Oral BID    heparin (porcine)  5,000 Units Subcutaneous 3 times per day    insulin lispro  0-6 Units Subcutaneous TID WC    insulin lispro  0-3 Units Subcutaneous Nightly      dextrose       bisacodyl, magnesium sulfate, ipratropium-albuterol, sodium chloride flush, ondansetron, glucose, dextrose, glucagon (rDNA), dextrose    Lab Data:  CBC: No results for input(s): WBC, HGB, HCT, MCV, PLT in the last 72 hours. BMP: Recent Labs      10/27/18   0635  10/28/18   0655  10/29/18   0520   NA  143  141  141   K  4.0  3.7  3.6   CL  97*  95*  95*   CO2  33*  34*  35*   PHOS  3.6  3.8  3.6   BUN  79*  77*  73*   CREATININE  2.8*  2.6*  2.5*     PT/INR: No results for input(s): PROTIME, INR in the last 72 hours. BNP:  No results for input(s): PROBNP in the last 72 hours. TROPONIN: No results for input(s): TROPONINT in the last 72 hours. ECHO :   10/22/2018   Left ventricular function is hyperdynamic , EF is estimated at >55%.    Mild concentric left ventricular hypertrophy.   Grade III diastolic dysfunction suspected.   Severely dilated left atrium.   Markedly enlarged right atrium size.   Severly dilated right ventricle.   Right ventricular septum flattened in systole consistent with RV pressure   overload.   Flattened in diastole (\"D\" shaped left ventricle) consistent with right   ventricular volume overload.   Right ventricular systolic pressure of 63 mm Hg which is suggestive of   severe pulmonary hypertension.   Mild aortic regurgitation is noted.   Moderate-to-severe mitral stenosis.   Mild to Moderate mitral regurgitation is present.   Moderate-to-severe tricuspid regurgitation.   Patent foramen ovale with left-to-right shunt was

## 2018-10-29 NOTE — CARE COORDINATION
Follow up visit with pt. Cm discussed possible SNF placement with pt. Pt expressed interest in [facility] as 1st preference and [facility] as 2nd choice. Pt chose [facility] because she advised she knew the , Mimi Lee. CM explained that he is no longer the  at [facility] but is thought to have taken a regional position with that company. Pt was then unsure as to whether to go with [facility] as 1st preference or 2nd but decided for CM to go ahead as planned with the preferences as originally indicated. One of pt's son's lives in [place]. Cm contacted [facility] with voice mail message left for admissions re; referral. Cm also contacted [facility] re; bed availability and they do have a bed available. Cm to follow for SNF placement.

## 2018-10-30 NOTE — PROGRESS NOTES
left-to-right shunt was visualized. Dilated IVC with poor inspiration collapse consistent with elevated right atrial pressure. No evidence of any pericardial effusion. Impression:  Principal Problem:    DEMETRIUS (acute kidney injury) ([address]  Active Problems:    PAT (paroxysmal atrial tachycardia) ([facility])    Dyspnea  Resolved Problems:    * No resolved hospital problems. *       All labs, medications and tests reviewed by myself , continue all other medications of all above medical condition listed as is except for changes mentioned above. Thank you very much for consult , please call with questions. Electronically signed by YARITZA Brand CNP on 10/30/2018 at 10:11 AM     I have seen ,spoken to  and examined this patient personally, independently of the nurse practitioner. I have reviewed the hospital care given to date and reviewed all pertinent labs and imaging. The plan was developed mutually at the time of the visit with the patient,  NP  and myself. I have spoken with patient, nursing staff and provided written and verbal instructions . The above note has been reviewed and I agree with the assessment, diagnosis, and treatment plan with changes made by me as follows     CARDIOLOGY ATTENDING ADDENDUM    HPI:  I have reviewed the above HPI  And agree with above   Kash Hayes is a [de-identified] y. o.year old who and presents with had concerns including Shortness of Breath. Chief Complaint   Patient presents with    Shortness of Breath     Interval history:  Breathing better    Physical Exam:  General:  Awake, alert, NAD  Head:normal  Eye:normal  Neck:  No JVD   Chest:  Clear to auscultation, respiration easy  Cardiovascular:  RRR S1S2  Abdomen:   nontender  Extremities:  tr edema  Pulses; palpable  Neuro: grossly normal      MEDICAL DECISION MAKING;    I agree with the above plan, which was planned by myself and discussed with NP.   Pt has severe VHD, invasive route has been DW pt and family, has been decided to manage

## 2018-10-30 NOTE — PROGRESS NOTES
resolved hospital problems. *    Blood pressure 113/70, pulse 84, temperature 97.7 °F (36.5 °C), temperature source Oral, resp. rate 27, height 5' (1.524 m), weight 213 lb 14.4 oz (97 kg), SpO2 100 %. Subjective:  Symptoms:  Stable. Diet:  Poor intake. Pain:  She reports no pain. Objective:  General Appearance:  Comfortable. Vital signs: (most recent): Blood pressure 126/61, pulse 85, temperature 98.3 °F (36.8 °C), temperature source Oral, resp. rate 22, height 5' (1.524 m), weight 213 lb 14.4 oz (97 kg), SpO2 100 %. Vital signs are normal.    HEENT: Normal HEENT exam.    Lungs: Tachypnea. There are rales. (Appears chronic and stable)  Heart: Normal rate. Abdomen: Abdomen is soft. There is no abdominal tenderness. Extremities: Decreased range of motion. There is dependent edema. Neurological: Patient is alert. Pupils:  Pupils are equal, round, and reactive to light. Skin:  Warm. Assessment:  (ACute resp failure with acute on chronic diastolic and systolic CHF   -recent echo with improved EF  -zaroxyln, demedex  -BB, not on ACE. ARB aldactone, ARNI due to CKD  -lasix drip stopped  Afib with MS and pulm HTN  -not a candidate for surgical intervention  Acute kidney 2/2 ATN with CKD  -albumin was given initially   HTN  -BB  DM  -SSI  HPL  -statin  Compression fx  -ortho eval and no intervention due to status  PAD  -ASA  COPD  -duonebs  Severe PCM  Neck pain  -due to OA  Right eye blindness  -chronic      Pt unfortunately will short of breath after discharge due to severe valvular disease and CHF. Discussions about hospice noted. Discharge today. Poor prognosis).        Batsheva Mejia MD  10/30/2018

## 2018-10-30 NOTE — PROGRESS NOTES
Attempt to call report to [facility], 4x. Message left for return call to this RN, with phone number.

## 2018-10-30 NOTE — PROGRESS NOTES
Pt was able to stand for approx one minute and pivot to chair with heavy two person assist.  Pt stated she was \"short of breath\"  Oxygen level stayed above 92%

## 2018-10-30 NOTE — DISCHARGE SUMMARY
changed:  how much to take     The Chuck-Rubén Tube/Plugs Misc  1 Device by Does not apply route as needed (as needed)  What changed:  Another medication with the same name was added. Make sure you understand how and when to take each. * Nebulizer Compressor Kit  1 kit by Does not apply route once for 1 dose  What changed: You were already taking a medication with the same name, and this prescription was added. Make sure you understand how and when to take each. * This list has 2 medication(s) that are the same as other medications prescribed for you. Read the directions carefully, and ask your doctor or other care provider to review them with you.             CONTINUE taking these medications    albuterol sulfate  (90 Base) MCG/ACT inhaler  Commonly known as:  PROAIR HFA  Inhale 2 puffs into the lungs 4 times daily     allopurinol 100 MG tablet  Commonly known as:  ZYLOPRIM     aspirin 81 MG tablet     budesonide 0.5 MG/2ML nebulizer suspension  Commonly known as:  PULMICORT  Take 2 mLs by nebulization 2 times daily Dx Code COPD J44.9     ferrous sulfate 325 (65 Fe) MG tablet     ipratropium-albuterol 0.5-2.5 (3) MG/3ML Soln nebulizer solution  Commonly known as:  DUONEB  USE 3 ML VIA NEBULIZER FOUR TIMES DAILY     metolazone 2.5 MG tablet  Commonly known as:  ZAROXOLYN     montelukast 10 MG tablet  Commonly known as:  SINGULAIR     multivitamin per tablet     omeprazole 40 MG delayed release capsule  Commonly known as:  PRILOSEC     OXYGEN     potassium chloride 10 MEQ extended release tablet  Commonly known as:  KLOR-CON     simvastatin 20 MG tablet  Commonly known as:  ZOCOR     torsemide 20 MG tablet  Commonly known as:  DEMADEX     VITAMIN B 12 PO     vitamin D 61583 units Caps capsule  Commonly known as:  ERGOCALCIFEROL        STOP taking these medications    cephALEXin 500 MG capsule  Commonly known as:  KEFLEX     glimepiride 2 MG tablet  Commonly known as:  AMARYL     VICTOZA SC Where to Get Your Medications      Information about where to get these medications is not yet available    Ask your nurse or doctor about these medications  · bisacodyl 10 MG suppository  · docusate 100 MG Caps  · insulin lispro 100 UNIT/ML injection vial  · metoprolol tartrate 25 MG tablet  · midodrine 10 MG tablet  · Nebulizer Compressor Kit     \    Activity: activity as tolerated  Diet: diabetic diet  Wound Care: none needed    Follow-up with PCP  Discharge time 35min.     Signed:  Jaycee Sparks  10/30/2018  1:28 PM

## 2018-10-30 NOTE — CARE COORDINATION
Cm contacted [facility] admissions to see if they have determined if they will be able to accept pt and Alexandra Lion advised that she did not receive referral. Referral provided and facesheet re-faxed. They will contact  once they review records.

## 2018-10-30 NOTE — PLAN OF CARE
Problem: Falls - Risk of:  Goal: Absence of physical injury  Absence of physical injury   Outcome: Ongoing
improve   Outcome: Ongoing      Problem: Risk for Impaired Skin Integrity  Goal: Tissue integrity - skin and mucous membranes  Structural intactness and normal physiological function of skin and  mucous membranes.    Outcome: Ongoing
improve   Outcome: Ongoing      Problem: Risk for Impaired Skin Integrity  Goal: Tissue integrity - skin and mucous membranes  Structural intactness and normal physiological function of skin and  mucous membranes.    Outcome: Ongoing

## 2018-12-11 PROBLEM — I50.43 CHF (CONGESTIVE HEART FAILURE), NYHA CLASS I, ACUTE ON CHRONIC, COMBINED (HCC): Status: ACTIVE | Noted: 2018-01-01

## 2018-12-12 NOTE — ED NOTES
Bed: H-01  Expected date:   Expected time:   Means of arrival:   Comments:  Med trans Antonio Cage RN  12/11/18 2001

## 2018-12-12 NOTE — H&P
HISTORY AND PHYSICAL  (Hospitalist, Internal Medicine)  IDENTIFYING INFORMATION   PATIENT:  Nevaeh Massey  MRN:  [id number]  ADMIT DATE: 12/11/2018  TIME OF EVALUATION: 12/11/2018 10:01 PM    CHIEF COMPLAINT   SOB    HISTORY OF PRESENT ILLNESS   Nevaeh Massey is a [de-identified] y.o. female who presents with 1.5 days hx of progressive SOB. Uses 3 L NC chronically. Had some symptoms yesterday but significantly worse today with pulse ox in low 80s on NRB mask. Had to be place on BIPAP with some improvement. She was so SOB that she had trouble speaking in sentences. PAST MEDICAL, SURGICAL, FAMILY, and SOCIAL HISTORY     Past Medical History:   Diagnosis Date    CAD (coronary artery disease)     COPD (chronic obstructive pulmonary disease) ([address]     [facility] mellitus [facility]     Gallstones     H/O cardiovascular stress test 9/2007, 9/2009, 7/2010 7/6/2010-Rest ef is 67%. Global LV systolic function is normal. No ECG changes. Unremarkable pharmacological stress test.    H/O complete electrocardiogram 5/13/2010    H/O Doppler ultrasound carotid doppler 2/2009, 8/2010 8/25/2010-heterogeneous irregular athresclerotic plaque in the R internal carotid artery.  H/O echocardiogram 11/07/2013    EF=>55%, normal LV systolic function, mild mitral regurg, severe mitral stenosis, severe pulmonary HTN, bioprosthetic aortic valve.  H/O echocardiogram 10/01/15    EF 60% Mildly hypertrophied LV with normal systolic function. prosthetic valve noted in aortic position. Mod pulmon HTN.  MR does not seem to be severe as in the past the mitral valve area 2.16 but heavily calcified and the mitral valve still appears to be stenotic.     H/O echocardiogram 07/13/2016    EF 60%, Mild LVH, Mod dilated LA, Mod Mitral Stenosis, Severe Pulm HTN, Significant valvular disease, Severe MS by pressure gradient    Hx of CABG     Hyperlipidemia     Hypertension     Murmur 1987    S/P AVR     Type II or unspecified type diabetes mellitus

## 2018-12-12 NOTE — ED PROVIDER NOTES
eMERGENCY dEPARTMENT eNCOUnter        [address] [facility]    Chief Complaint   Patient presents with    Shortness of Breath       HPI    Peggy Dobbs is a [de-identified] y.o. female who presents with shortness of breath. Patient from [place]. Patient poor historian, states she has been short of breath for years, cannot tell me when it worsened. Per nursing home report, symptoms began today. She apparently wears 3L nasal canula oxygen at all times. She denies any chest pain or cough. Denies any fever, chills. Denies abd pain, n/v/d. Denies leg pain or swelling. REVIEW OF SYSTEMS    Constitutional:  Denies fever. HENT:  Denies headache, nasal congestion, sore throat. Neck:  Denies neck pain. Respiratory:  + shortness of breath. Cardiovascular:  Denies chest pain. GI:  Denies abdominal pain, nausea, vomiting, diarrhea. :  Denies urinary symptoms. Musculoskeletal:  Denies extremity swelling. Integument:  Denies skin changes. Neurologic:  Denies any LOC. All other systems reviewed and are negative. PAST MEDICAL & SURGICAL HISTORY    Past Medical History:   Diagnosis Date    CAD (coronary artery disease)     [facility]     Diabetes mellitus [facility] [facility]     Gallstones     H/O cardiovascular stress test 9/2007, 9/2009, 7/2010 7/6/2010-Rest ef is 67%. Global LV systolic function is normal. No ECG changes. Unremarkable pharmacological stress test.    H/O complete electrocardiogram 5/13/2010    H/O Doppler ultrasound carotid doppler 2/2009, 8/2010 8/25/2010-heterogeneous irregular athresclerotic plaque in the R internal carotid artery.  H/O echocardiogram 11/07/2013    EF=>55%, normal LV systolic function, mild mitral regurg, severe mitral stenosis, severe pulmonary HTN, bioprosthetic aortic valve.  H/O echocardiogram 10/01/15    EF 60% Mildly hypertrophied LV with normal systolic function. prosthetic valve noted in aortic position. Mod pulmon HTN.  MR

## 2018-12-12 NOTE — ED NOTES
[id number] assigned/dirty @ [address] RN notified     Betty [place]  12/11/18 4205

## 2018-12-12 NOTE — CONSULTS
ampule Inhalation 4x daily Eliud Pool MD   1 ampule at 12/12/18 1153    midodrine (PROAMATINE) tablet 10 mg  10 mg Oral TID WC Eliud Pool MD   10 mg at 12/12/18 1329    montelukast (SINGULAIR) tablet 10 mg  10 mg Oral Nightly Eliud Pool MD        pantoprazole (PROTONIX) tablet 40 mg  40 mg Oral QAM AC Eliud Pool MD   40 mg at 12/12/18 1008    simvastatin (ZOCOR) tablet 20 mg  20 mg Oral Nightly Eliud Pool MD        acetaminophen (TYLENOL) tablet 650 mg  650 mg Oral Q6H PRN Mili Monte MD   650 mg at 12/12/18 6362    metoprolol tartrate (LOPRESSOR) tablet 12.5 mg  12.5 mg Oral BID Karina Baker MD   Stopped at 12/12/18 1021     Review of Systems:   · Constitutional: No Fever or Weight Loss   · Eyes: No Decreased Vision  · ENT: No Headaches, Hearing Loss or Vertigo  · Cardiovascular: As per HPI  · Respiratory: As per HPI  · Gastrointestinal: No abdominal pain, appetite loss, blood in stools, constipation, diarrhea or heartburn  · Genitourinary: No dysuria, trouble voiding, or hematuria  · Musculoskeletal:  No gait disturbance, weakness or joint complaints  · Integumentary: No rash or pruritis  · Neurological: No TIA or stroke symptoms  · Psychiatric: No anxiety or depression  · Endocrine: No malaise, fatigue or temperature intolerance  · Hematologic/Lymphatic: No bleeding problems, blood clots or swollen lymph nodes  · Allergic/Immunologic: No nasal congestion or hives  All systems negative except as marked. Physical Examination:    Vitals:    12/12/18 0300 12/12/18 0544 12/12/18 0758 12/12/18 1021   BP:  103/66  (!) 98/51   Pulse:  105  113   Resp: (!) 33 27     Temp:  97.7 °F (36.5 °C)     TempSrc:  Oral     SpO2:  97% 99%    Weight:  234 lb 3.2 oz (106.2 kg)     Height:           General Appearance:  Somnolent and drowsy    Constitutional:  Well developed, Well nourished, No acute distress, Non-toxic appearance.    HENT:  Normocephalic, Atraumatic, Bilateral external ears normal, Oropharynx moist, No oral exudates, Nose normal. Neck- Normal range of motion, No tenderness, Supple, No stridor,no apical-carotid delay  Lymphatics : no palpable lymph nodes  Eyes:  PERRL, EOMI, Conjunctiva normal, No discharge. Respiratory:  Normal breath sounds, No respiratory distress, No wheezing, No chest tenderness. ,no use of accessory muscles, crackles Present  Cardiovascular: (PMI) apex non displaced,no lifts no thrills, ankle swelling Present , 1+, s1 and s2 audible,Murmur. Present, JVD not noted    Abdomen /GI:  Bowel sounds normal, Soft, No tenderness, No masses, No gross visceromegaly   :  No costovertebral angle tenderness   Musculoskeletal:  No edema, no tenderness, no deformities.  Back- no tenderness  Integument:  Well hydrated, no rash   Lymphatic:  No lymphadenopathy noted   Neurologic:  Alert & oriented x 3, CN 2-12 normal, normal motor function, normal sensory function, no focal deficits noted           Medical decision making and Data review:    Lab Review   Recent Labs      12/12/18   0408   WBC  8.7   HGB  11.2*   HCT  35.7*   PLT  142      Recent Labs      12/12/18   0408   NA  140   K  4.4   CL  91*   CO2  24   BUN  110*   CREATININE  5.7*     Recent Labs      12/11/18 2010   AST  18   ALT  9*   BILITOT  0.6   ALKPHOS  73     Recent Labs      12/11/18 2010   TROPONINT  0.216*       Recent Labs      12/11/18 2010 12/12/18   0408   PROBNP  >51815*  >[id number]*     Lab Results   Component Value Date    INR 1.04 05/29/2013    PROTIME 11.4 05/29/2013       EKG: (reviewed by myself)    ECHO:(reviewed by myself)    Chest Xray:(reviewed by myself)  Xr Chest Portable    Result Date: 12/12/2018  EXAMINATION: SINGLE XRAY VIEW OF THE CHEST 12/12/2018 10:52 am COMPARISON: October 22, 2000 HISTORY: ORDERING SYSTEM PROVIDED HISTORY: s/p temp cath, eval fpr ptx TECHNOLOGIST PROVIDED HISTORY: Reason for exam:->s/p temp cath, eval fpr ptx Ordering Physician Provided Reason for Exam: sob Acuity: Unknown Type of Exam: Unknown Relevant Medical/Surgical History: cad   copd FINDINGS: Right IJ central catheter tip terminates over the SVC. There is no evidence of pneumothorax. The lungs are unchanged. Heart size and mediastinal contours are stable. Right IJ line placement without pneumothorax. Otherwise no change. Xr Chest Portable    Result Date: 12/11/2018  EXAMINATION: SINGLE XRAY VIEW OF THE CHEST 12/11/2018 8:21 pm COMPARISON: 10/22/2018 HISTORY: ORDERING SYSTEM PROVIDED HISTORY: sob TECHNOLOGIST PROVIDED HISTORY: Reason for exam:->sob Ordering Physician Provided Reason for Exam: SOB Acuity: Unknown Type of Exam: Initial Additional signs and symptoms: UNONE Relevant Medical/Surgical History: CAD, CARDIAC SX, COPD FINDINGS: The heart remains enlarged. There is pulmonary vascular congestion with bibasilar ground-glass opacity and blunting of the costophrenic sulci. No pneumothorax is seen. Mitral annular calcifications are present. Sternotomy wires remain in place. No acute osseous abnormality is seen. Cardiomegaly with hydrostatic edema and small bilateral pleural effusions in keeping with congestive heart failure. All labs, medications and tests reviewed by myself including data  from outside source , patient and available family . Continue all other medications of all above medical condition listed as is. Impression:  Active Problems:    CHF (congestive heart failure), NYHA class I, acute on chronic, combined ([address]  Resolved Problems:    * No resolved hospital problems. *      Assessment: [de-identified] y. o.year old with PMH of  has a past medical history of CAD (coronary artery disease); COPD (chronic obstructive pulmonary disease) ([address] Diabetes mellitus ([address] Gallstones; H/O cardiovascular stress test; H/O complete electrocardiogram; H/O Doppler ultrasound; H/O echocardiogram; H/O echocardiogram; H/O echocardiogram; Hx of CABG; Hyperlipidemia;  Hypertension; Murmur; S/P AVR; and Type II or unspecified type diabetes mellitus without mention of complication, not stated as uncontrolled. Plan and Recommendations:    End-stage congestive heart failure due to valvular disease, severe mitral stenosis, not a candidate for any intervention  CHF: We could try Lasix given her severe mitral  stenosis, she will have to time on dialysis due to hemodynamics  Consider palliative consult  DVT prophylaxis if no contraindication            Thank you  much for consult and giving us the opportunity in contributing in the care of this patient. Please feel free to call me for any questions.        Candy Steen MD, 12/12/2018 3:53 PM

## 2018-12-12 NOTE — PROGRESS NOTES
Nutrition Progression, Meal Intake, Supplement Intake, Wound Healing, Weight, Pertinent Labs      Electronically signed by Savage Theodore RD, DIANDRA on 12/12/18 at 12:49 PM    Contact Number: [phone]

## 2018-12-12 NOTE — CARE COORDINATION
.CM met with pt for d/c planning. Introduced self and updated white board. Pt states that she has been in [facility] since October and she moffett not want to return. She states that she did not like the care that she received. CM informed her that she can go to a different SNF. SNF list provided. Pt states that she will discuss this with her 2 sons later today and will call this CM with her new SNF choice. Pt has Medicare and will not require a pre-cert.   TE

## 2018-12-13 NOTE — PROGRESS NOTES
 metoprolol tartrate  12.5 mg Oral BID    furosemide  60 mg Intravenous Q8H      Infusions:    dextrose       PRN Meds:     glucose 15 g PRN   dextrose 12.5 g PRN   glucagon (rDNA) 1 mg PRN   dextrose 100 mL/hr PRN   acetaminophen 650 mg Q6H PRN     Subjective:   Pt is doing well. Denies any ongoing chest pain. Plan for HD today. Objective: Intake/Output Summary (Last 24 hours) at 12/13/18 1503  Last data filed at 12/13/18 1242   Gross per 24 hour   Intake             1220 ml   Output             2650 ml   Net            -1430 ml      Vitals:   Vitals:    12/13/18 0739   BP:    Pulse:    Resp:    Temp:    SpO2: 100%     Physical Exam:   Gen:  awake, alert, cooperative, no apparent distress  EYES:Lids and lashes normal, pupils equal, round ,extra ocular muscles intact, sclera clear, conjunctiva normal  ENT:  Normocephalic, oral pharynx with moist mucus membranes  NECK:  Supple, symmetrical, trachea midline, no adenopathy,  LUNGS:  Diffuse crackles seen  CARDIOVASCULAR:  Irregular rate and rhythm, normal S1 and S2,no murmur noted, peripheral pulses 2+ pitting edema lower extremities  ABDOMEN: Normal BS, Non tender, non distended, no HSM noted. MUSCULOSKELETAL:  ROM of all extremities grossly wnl  NEUROLOGIC: AOx 3,  Cranial nerves II-XII are grossly intact. Motor is 5 out of 5 bilaterally. Sensory is intact, no lateralizing findings.    SKIN: chronic venous stasis skins, right leg skin breakdown    Data:       CBC   Recent Labs      12/11/18 2010 12/12/18   0408  12/13/18   0611   WBC  8.7  8.7  8.7   HGB  11.7*  11.2*  11.3*   HCT  37.9  35.7*  36.0*   PLT  150  142  114*      BMP   Recent Labs      12/12/18   0408  12/12/18   1652  12/13/18   0611   NA  140  140  140   K  4.4  4.0  4.5   CL  91*  95*  94*   CO2  24  26  27   BUN  110*  73*  90*   CREATININE  5.7*  4.0*  4.9*         Electronically signed by Charly Davis MD on 12/13/2018 at 3:03 PM

## 2018-12-13 NOTE — PROGRESS NOTES
[address] [place] [address] [address] [place] [address]  Phone: [phone]  Office Hours: 8:30AM - 4:30PM  Monday - Friday      Nephrology  Dialysis Note        PROCEDURE:  Patient seen for hemodialysis      PHYSICIAN:  PK    INDICATION:  Acute renal failure, Congestive heart failure      RX:  See dialysis flowsheet for specifics on access, blood flow rate, dialysate baths, duration of dialysis, anticoagulation and other technical information.       COMMENTS:  BP low 80s  Albumin being infused

## 2018-12-13 NOTE — CARE COORDINATION
[address] Transitions Interview     2018    Patient: Rula Cancer Patient : 1940   MRN: [id number]  Reason for Admission: There are no discharge diagnoses documented for the most recent discharge. RARS: Readmission Risk Score: 16       Spoke with:   patient      Readmission Risk  Patient Active Problem List   Diagnosis    Hypertension    Hyperlipidemia    [facility]    Hx of CABG    S/P AVR    CAD (coronary artery disease)    Pneumonia    Hypoxia    COPD with acute exacerbation (HCC)    [facility]    Acute on chronic diastolic heart failure (HCC)    COPD (chronic obstructive pulmonary disease) (HCC)    YAYO (obstructive sleep apnea)    Acute on chronic congestive heart failure (HCC)    Rheumatic mitral stenosis    Coronary artery disease involving coronary bypass graft of native heart without angina pectoris    Chronic obstructive pulmonary disease ([facility])    Essential hypertension    DEMETRIUS (acute kidney injury) (HCC)    PAT (paroxysmal atrial tachycardia) ([facility])    Dyspnea    CHF (congestive heart failure), NYHA class I, acute on chronic, combined [facility])       Inpatient Assessment  Care Transitions Summary    Care Transitions Inpatient Review  Medication Review  Are you able to afford your medications?:  Yes  How often do you have difficulty taking your medications?:  I always take them as prescribed.   Housing Review  Who do you live with?:  Alone, Child  Are you an active caregiver in your home?:  No  Social Support  Durable Medical Equipment  Patient DME:  Damian Subramanian, Wheelchair, [address] [facility] [place] chair, Other  Other Patient DME:  [facility]  Patient Home Equipment:  Oxygen  Functional Review  Ability to seek help/take action for Emergent/Urgent situations i.e. fire, crime, inclement weather or health crisis.:  Needs Assistance  Ability handle personal hygiene needs (bathing/dressing/grooming):  Needs Assistance  Ability to manage medications:

## 2018-12-13 NOTE — PROGRESS NOTES
Cigarettes. She has a 50.00 pack-year smoking history. She has never used smokeless tobacco. She reports that she does not drink alcohol or use drugs. Family history:  family history includes Cancer in her mother; Heart Disease in her father; High Blood Pressure in her father. Allergies   Allergen Reactions    Lisinopril        Review of Systems:   All 14 systems were reviewed and are negative  Except for the positive findings  which as documented     BP 96/66   Pulse 111   Temp 97.6 °F (36.4 °C) (Oral)   Resp (!) 31   Ht 5' (1.524 m)   Wt 234 lb 3.2 oz (106.2 kg)   SpO2 100%   BMI 45.74 kg/m²     Intake/Output Summary (Last 24 hours) at 12/13/18 6481  Last data filed at 12/13/18 4168   Gross per 24 hour   Intake              740 ml   Output             2650 ml   Net            -1910 ml       Physical Exam:  Constitutional:  Well developed, Well nourished, No acute distress  HENT:  Normocephalic, Atraumatic, Bilateral external ears normal, Oropharynx moist, No oral exudates, Nose normal.   Neck- dialysis catheter noted to right neck  Eyes:  PERRL, EOMI, Conjunctiva normal   Respiratory: bi basilar crackles  No respiratory distress, No wheezing, No chest tenderness. Cardiovascular:  irregular heart rate, irregularl rhythm, murmur appreciated, No rubs appreciated, No gallops appreciated, JVP not elevated  Abdomen/GI:  Bowel sounds normal, Soft, No tenderness, Musculoskeletal:  Intact distal pulses, +2 edema to legs,+ tenderness to right leg,   Integument:  Lower right leg is wrapped   Lymphatic:  No lymphadenopathy noted.    Neurologic:  Alert & oriented x 3  Psychiatric:  Affect and Mood :pleasant     Medications:    magnesium sulfate  2 g Intravenous Once    heparin (porcine)  5,000 Units Subcutaneous Q8H    insulin lispro  0-6 Units Subcutaneous TID WC    insulin lispro  0-3 Units Subcutaneous Nightly    aspirin  81 mg Oral Daily    budesonide  500 mcg Nebulization BID    docusate sodium  100 mg

## 2018-12-13 NOTE — PROGRESS NOTES
Layout: One level  Home Access: Level entry, Ramped entrance  Bathroom Shower/Tub: Tub/Shower unit, Shower chair without back (bench)  Home Equipment: Rolling walker  Receives Help From: Family (2 sons help every weekend for high level IADLs particurally house cleaning)  ADL Assistance: Independent (pt has been performing sponge baths for about a year)  Homemaking Assistance: Needs assistance  Homemaking Responsibilities: Yes  Ambulation Assistance: Independent  Transfer Assistance: Independent  Occupation: Retired  Additional Comments: Pt reports about 4 falls in last 6 months pt reporting due to balance       Objective   Vision: Within Functional Limits (glasses)  Hearing: Within functional limits    Orientation  Overall Orientation Status: Within Normal Limits  Observation/Palpation  Posture: Fair (pt slouched shoulders)  Observation: pt received supine in bed, bilateral feet wrapped in bandages, agreeable to therapy  Balance  Sitting Balance: Contact guard assistance  Standing Balance: Unable to assess(comment)  Standing Balance  Time: unable to stand due to bilateral bandages on feet and greatly increased pain in feet w/ increased movement  ADL  Feeding: Moderate assistance (requiring hand over hand assist to stabilize tremors to bring cup of water to mouth)  Grooming: Minimal assistance  UE Bathing: Moderate assistance  LE Bathing: Maximum assistance  UE Dressing: Moderate assistance  LE Dressing: Dependent/Total  Toileting: Dependent/Total (mcnair cath and use of bedpan)  Additional Comments: Some ADL determined per observation of actual ADL performance, functional mobility, balance, activity tolerance and cognition  Tone RUE  RUE Tone: Normotonic  Tone LUE  LUE Tone: Normotonic  Coordination  Movements Are Fluid And Coordinated: No  Coordination and Movement description: Tremors; Left UE;Right UE     Bed mobility  Rolling to Left: Moderate assistance  Rolling to Right: Moderate assistance  Supine to Sit: Moderate assistance  Scooting: Moderate assistance  Comment: modA for LE management and trunk, cues for sequencing        Cognition  Overall Cognitive Status: WFL  Perception  Overall Perceptual Status: WFL     Sensation  Overall Sensation Status: Impaired  Light Touch: Partial deficits in the RUE;Partial deficits in the LUE;Partial deficits in the LLE;Partial deficits in the RLE        LUE AROM (degrees)  LUE AROM : WFL  Left Hand AROM (degrees)  Left Hand AROM: WFL  RUE AROM (degrees)  RUE AROM : WFL  Right Hand AROM (degrees)  Right Hand AROM: WFL  LUE Strength  Gross LUE Strength: Exceptions to [facility] PEMGoal Zero  L Hand Grasp: 4-/5  RUE Strength  Gross RUE Strength: Exceptions to [facility] PEMGoal Zero  R Hand Grasp: 4-/5           Therapeutic Activity Training:   Therapeutic activity training was instructed today. Cues were given for safety, sequence, UE/LE placement, awareness, and balance. Activities performed today included bed mobility training, sup-sit, sit to supine, scooting to [facility]         Assessment   Performance deficits / Impairments: Decreased functional mobility ; Decreased safe awareness;Decreased endurance;Decreased balance;Decreased high-level IADLs;Decreased ADL status; Decreased strength;Decreased sensation;Decreased coordination  Treatment Diagnosis: CHF  Prognosis: Good  Decision Making: Medium Complexity  Assistance / Modification: Pt is a [de-identified] yo female admitted from home for CHF. Pt at baseline is Independent in ADLs, needs some assistance from sons for high level IADLs, and is Independent in functional mobility/transfers w/ AD and has been experiencing frequent falls. Pt currently presents w/ above deficits and requires greatly increased assistance for functional activities. Pt would benefit from continued acute care OT services w/ discharge to SNF.    Patient Education: ot role, discharge rec, proper body mechanics for bed mobility  Barriers to Learning: none  REQUIRES OT FOLLOW UP: Yes  Activity Tolerance  Activity

## 2018-12-13 NOTE — CONSULTS
Tunneling (cm) 0 cm 12/13/2018  9:00 AM   Tunneling Position ___ O'Clock 0 12/13/2018  9:00 AM   Undermining Starts ___ O'Clock 0 12/13/2018  9:00 AM   Undermining Ends___ O'Clock 0 12/13/2018  9:00 AM   Undermining Maxium Distance (cm) 0 12/13/2018  9:00 AM   Wound Assessment Red 12/13/2018  9:00 AM   Drainage Amount Large 12/13/2018  9:00 AM   Drainage Description Serous 12/13/2018  9:00 AM   Odor None 12/13/2018  9:00 AM   Margins Undefined edges 12/13/2018  9:00 AM   Yumiko-wound Assessment Open blisters;Painful 12/13/2018  9:00 AM   Non-staged Wound Description Full thickness 12/13/2018  9:00 AM   [place]%Wound Bed 0 12/13/2018  9:00 AM   Red%Wound Bed 100 12/13/2018  9:00 AM   Yellow%Wound Bed 0 12/13/2018  9:00 AM   Black%Wound Bed 0 12/13/2018  9:00 AM   Purple%Wound Bed 0 12/13/2018  9:00 AM   Other%Wound Bed 0 12/13/2018  9:00 AM   Number of days: 0       Wound 12/13/18 WOUND LEFT MEDIAL FOOT BLISTER (Active)   Wound Image   12/13/2018  9:00 AM   Dressing Status Clean;Dry; Intact 12/13/2018  9:00 AM   Dressing Changed Changed/New 12/13/2018  9:00 AM   Wound Cleansed Rinsed/Irrigated with saline 12/13/2018  9:00 AM   Wound Length (cm) 1.5 cm 12/13/2018  9:00 AM   Wound Width (cm) 1.5 cm 12/13/2018  9:00 AM   Wound Depth (cm) 0.1 cm 12/13/2018  9:00 AM   Wound Surface Area (cm^2) 2.25 cm^2 12/13/2018  9:00 AM   Wound Volume (cm^3) 0.22 cm^3 12/13/2018  9:00 AM   Distance Tunneling (cm) 0 cm 12/13/2018  9:00 AM   Tunneling Position ___ O'Clock 0 12/13/2018  9:00 AM   Undermining Starts ___ O'Clock 0 12/13/2018  9:00 AM   Undermining Ends___ O'Clock 0 12/13/2018  9:00 AM   Undermining Maxium Distance (cm) 0 12/13/2018  9:00 AM   Wound Assessment Fluid filled blister 12/13/2018  9:00 AM   Drainage Amount None 12/13/2018  9:00 AM   Odor None 12/13/2018  9:00 AM   Margins Attached edges 12/13/2018  9:00 AM   Yumiko-wound Assessment Blanchable erythema 12/13/2018  9:00 AM   Non-staged Wound Description Not applicable CLUSTER-Dressing/Treatment:  (AQUACEL, ABD, KERLIX)  Wound 12/13/18 WOUND LEFT MEDIAL FOOT BLISTER-Dressing/Treatment:  (AQUACEL, ABD, KERLIX)  Wound 12/13/18 WOUND LEFT 2ND TOE-Dressing/Treatment:  (AQUACEL, ABD, KERLIX)  Patient in bed. Assessed, cleansed, measured, and photos taken all wounds. Dressings applied. Bilateral lower legs painful. Bilateral heels mushy and darker in color than feet but chiara well. Skin prep applied. Heels floated on pillows. Buttocks, coccyx area clean and intact . Redness noted but blanches well. Barrier cream applied. Patient turned to right with pillow. Joni of 15 indicates risk for skin breakdown. Joni protocol in place. Specialty Bed Required : Yes   [] Low Air Loss   [x] Pressure Redistribution  [] Fluid Immersion  [] Bariatric  [] Total Pressure Relief  [] Other:     Current Diet: DIET CARB CONTROL;   Dietician consult:  No    Discharge Plan:  Placement for patient upon discharge: skilled nursing   Patient appropriate for Outpatient [address] [place] [address] Street: No    Referrals:  []   [] [address] [place] [address] [facility]  [] Supplies  [] Other    Patient/Caregiver Teaching:  Level of patient/caregiver understanding able to:   [] Indicates understanding       [] Needs reinforcement  [] Unsuccessful      [] Verbal Understanding  [] Demonstrated understanding       [x] No evidence of learning  [] Refused teaching         [] N/A       Electronically signed by Leonarda Yu RN, CWOCN on 12/13/2018 at 11:52 AM

## 2018-12-14 NOTE — CARE COORDINATION
[address] Transitions Interview     2018    Patient: Marifer Dangelo Patient : 1940   MRN: [id number]  Reason for Admission: There are no discharge diagnoses documented for the most recent discharge. RARS: Readmission Risk Score: 16       Spoke with:   patient      Readmission Risk  Patient Active Problem List   Diagnosis    Hypertension    Hyperlipidemia    Diabetes mellitus ([facility] [address]    Hx of CABG    S/P AVR    CAD (coronary artery disease)    Pneumonia    Hypoxia    COPD with acute exacerbation (HCC)    [facility]congest[facility]) [facility])    Acute on chronic diastolic heart failure (HCC)    COPD (chronic obstructive pulmonary disease) (HCC)    YAYO (obstructive sleep apnea)    Acute on chronic congestive heart failure (HCC)    Rheumatic mitral stenosis    Coronary artery disease involving coronary bypass graft of native heart without angina pectoris    Chronic obstructive pulmonary disease ([facility])    Essential hypertension    DEMETRIUS (acute kidney injury) ([facility])    PAT (paroxysmal atrial tachycardia) ([facility])    Dyspnea    CHF (congestive heart failure), NYHA class I, acute on chronic, combined [facility])       Inpatient Assessment  Care Transitions Summary    Care Transitions Inpatient Review  Medication Review  Are you able to afford your medications?:  Yes  How often do you have difficulty taking your medications?:  I always take them as prescribed.   Housing Review  Who do you live with?:  Alone, Child  Are you an active caregiver in your home?:  No  Social Support  Durable Medical Equipment  Patient DME:  [employer], Wheelchair, [facility] chair, Other  Other Patient DME:  [facility]  Patient Home Equipment:  Oxygen  Functional Review  Ability to seek help/take action for Emergent/Urgent situations i.e. fire, crime, inclement weather or health crisis.:  Needs Assistance  Ability handle personal hygiene needs (bathing/dressing/grooming):  Needs Assistance  Ability to manage medications:

## 2018-12-14 NOTE — PROGRESS NOTES
insulin lispro  0-6 Units Subcutaneous TID     insulin lispro  0-3 Units Subcutaneous Nightly    aspirin  81 mg Oral Daily    budesonide  500 mcg Nebulization BID    docusate sodium  100 mg Oral BID    ipratropium-albuterol  1 ampule Inhalation 4x daily    midodrine  10 mg Oral TID WC    montelukast  10 mg Oral Nightly    pantoprazole  40 mg Oral QAM AC    simvastatin  20 mg Oral Nightly    metoprolol tartrate  12.5 mg Oral BID    furosemide  60 mg Intravenous Q8H      Infusions:    dextrose       PRN Meds:     glucose 15 g PRN   dextrose 12.5 g PRN   glucagon (rDNA) 1 mg PRN   dextrose 100 mL/hr PRN   acetaminophen 650 mg Q6H PRN     Subjective:   Pt is at baseline mentation. Objective: Intake/Output Summary (Last 24 hours) at 12/14/18 1035  Last data filed at 12/14/18 8038   Gross per 24 hour   Intake              720 ml   Output             3400 ml   Net            -2680 ml      Vitals:   Vitals:    12/14/18 0930   BP: 94/66   Pulse: 112   Resp: 25   Temp: 97.5 °F (36.4 °C)   SpO2:      Physical Exam:   Gen:  awake, alert, cooperative, no apparent distress  EYES:Lids and lashes normal, pupils equal, round ,extra ocular muscles intact, sclera clear, conjunctiva normal  ENT:  R sided IJ present. NECK:  Supple, symmetrical, trachea midline, no adenopathy,  LUNGS:  Diffuse crackles seen  CARDIOVASCULAR:  Irregular rate and rhythm, normal S1 and S2,no murmur noted, peripheral pulses 2+ pitting edema lower extremities  ABDOMEN: Normal BS, Non tender, non distended, no HSM noted.   MUSCULOSKELETAL:  ROM of all extremities grossly wnl  NEUROLOGIC:grossly intact  SKIN: chronic venous stasis skins, right leg skin breakdown    Data:       CBC   Recent Labs      12/12/18   0408  12/13/18   0611  12/14/18   0600   WBC  8.7  8.7  9.4   HGB  11.2*  11.3*  10.4*   HCT  35.7*  36.0*  32.8*   PLT  142  114*  101*      BMP   Recent Labs      12/12/18   1652  12/13/18   0611  12/14/18   0600   NA  140  140

## 2018-12-14 NOTE — DISCHARGE INSTR - COC
failure ([facility]) I50.33    COPD (chronic obstructive pulmonary disease) ([facility]) J44.9    YAYO (obstructive sleep apnea) G47.33    Acute on chronic congestive heart failure ([facility]) I50.9    Rheumatic mitral stenosis I05.0    Coronary artery disease involving coronary bypass graft of native heart without angina pectoris I25.810    Chronic obstructive pulmonary disease ([facility]) J44.9    Essential hypertension I10    DEMETRIUS (acute kidney injury) ([facility]) N17.9    PAT (paroxysmal atrial tachycardia) ([facility]) I47.1    Dyspnea R06.00    CHF (congestive heart failure), NYHA class I, acute on chronic, combined ([facility]) I50.43       Isolation/Infection:   Isolation          No Isolation            Nurse Assessment:  Last Vital Signs: BP 94/66   Pulse 112   Temp 97.5 °F (36.4 °C) (Oral)   Resp 15   Ht 5' (1.524 m)   Wt 226 lb 13.7 oz (102.9 kg)   SpO2 95%   BMI 44.30 kg/m²     Last documented pain score (0-10 scale): Pain Level: 7  Last Weight:   Wt Readings from Last 1 Encounters:   12/13/18 226 lb 13.7 oz (102.9 kg)     Mental Status:  {IP PT MENTAL STATUS:38543}    IV Access:  { LISSETTE IV ACCESS:244945396}    Nursing Mobility/ADLs:  Walking   {CHP DME VBJS:239077568}  Transfer  {CHP DME IRWL:518855417}  Bathing  {CHP DME WKKO:699322667}  Dressing  {CHP DME VULN:915932672}  Toileting  {CHP DME IOZX:090366104}  Feeding  {CHP DME SPLL:197010061}  Med Admin  {CHP DME VXTP:195165679}  Med Delivery   { LISSETTE MED Delivery:107408666}    Wound Care Documentation and Therapy:  Wound 10/24/18 Right groin fold - MASD (moisture associated skin damage) (Active)   Number of days: 51       Wound 10/24/18 Left groin fold- MASD (moisture associated skin damage) (Active)   Number of days: 51       Wound 12/12/18 Leg Right; Lower;Medial denuded area (Active)   Wound Image   12/13/2018  9:00 AM   Wound Venous 12/12/2018 12:45 AM   Dressing Status Changed;Clean;Dry; Intact 12/14/2018  6:15 AM   Dressing Changed Changed/New 12/14/2018  6:15 AM (cm^3) 0.62 cm^3 12/13/2018  9:00 AM   Distance Tunneling (cm) 0 cm 12/13/2018  9:00 AM   Tunneling Position ___ O'Clock 0 12/13/2018  9:00 AM   Undermining Starts ___ O'Clock 0 12/13/2018  9:00 AM   Undermining Ends___ O'Clock 0 12/13/2018  9:00 AM   Wound Assessment JOON 12/13/2018  8:18 PM   Drainage Amount None 12/13/2018  8:18 PM   Drainage Description Serous 12/13/2018  9:00 AM   Odor None 12/13/2018  8:18 PM   Margins Defined edges 12/13/2018  9:00 AM   Yumiko-wound Assessment Red; Swelling 12/13/2018  8:18 PM   Non-staged Wound Description Full thickness 12/13/2018  9:00 AM   [place]%Wound Bed 40 12/13/2018  9:00 AM   Red%Wound Bed 0 12/13/2018  9:00 AM   Yellow%Wound Bed 40 12/13/2018  9:00 AM   Black%Wound Bed 0 12/13/2018  9:00 AM   Purple%Wound Bed 0 12/13/2018  9:00 AM   Other%Wound Bed 20 BROWN 12/13/2018  9:00 AM   Number of days: 2       Wound 12/13/18 WOUND RIGHT MEDIAL FOOT (Active)   Dressing Status Clean;Dry; Intact 12/13/2018  8:18 PM   Dressing Changed Changed/New 12/13/2018  9:00 AM   Dressing/Treatment Dry dressing 12/13/2018  8:18 PM   Wound Cleansed Rinsed/Irrigated with saline 12/13/2018  9:00 AM   Wound Length (cm) 5 cm 12/13/2018  9:00 AM   Wound Width (cm) 6 cm 12/13/2018  9:00 AM   Wound Depth (cm) 0.1 cm 12/13/2018  9:00 AM   Wound Surface Area (cm^2) 30 cm^2 12/13/2018  9:00 AM   Wound Volume (cm^3) 3 cm^3 12/13/2018  9:00 AM   Distance Tunneling (cm) 0 cm 12/13/2018  9:00 AM   Tunneling Position ___ O'Clock 0 12/13/2018  9:00 AM   Undermining Starts ___ O'Clock 0 12/13/2018  9:00 AM   Undermining Ends___ O'Clock 0 12/13/2018  9:00 AM   Undermining Maxium Distance (cm) 0 12/13/2018  9:00 AM   Wound Assessment JOON 12/13/2018  8:18 PM   Drainage Amount None 12/13/2018  8:18 PM   Drainage Description Serous 12/13/2018  9:00 AM   Odor None 12/13/2018  8:18 PM   Margins Defined edges 12/13/2018  9:00 AM   Yumiko-wound Assessment Red; Swelling 12/13/2018  8:18 PM   Non-staged Wound Description Full

## 2018-12-14 NOTE — CONSULTS
Palliative Medicine Consultation    Reason for Consult:      __X__ Advance Care Planning--Full Code; patient has a Living Will, I sense she'd favor DNR-CC-A, but she is reluctant to commit to this now  __X__Transition of Care Planning--likely to go to [facility], for rehab  __X__ Psychosocial Support--family support  __X__ Symptom Management--goals of care, code status    Recommendations:    1. Continue excellent medical management of this older woman with severe CHF, and now is anuric, renal failure, on dialysis  2. She denies pain; goals are to go to [facility]; ideally she'd like to return home, but realizes this is unlikely  3. I urged her to talk with her sons, so they know her wishes for withdrawal of care, if she becomes irreversibly unresponsive +/- life support    Requesting Physician:  Dr. Gem Pat:  Dyspnea     History Obtained From:  patient, electronic medical record    HISTORY OF PRESENT ILLNESS:    Mrs. Leanne Chavarria has severe CHF; s/p aortic valve replacement 2010, but recent mitral valve failure could no longer be repaired, and her cardiac function has been worsening since. She was living at home until recently; she came here from [facility]. Now she also has secondary renal failure, and has had 2 dialysis treatments. She became delirious after treatment yesterday and had a very distressing night. Now she denies pain and is back to baseline dyspnea. I was asked to see patient to assist with goals of care and code status discussion (we have met before, in October). Past Medical History:        Diagnosis Date    CAD (coronary artery disease)     COPD [facility]chronic [facility]) ([facility] [address]     Diabetes mellitus ([facility] [address]     Gallstones     H/O cardiovascular stress test 9/2007, 9/2009, 7/2010 7/6/2010-Rest ef is 67%. Global LV systolic function is normal. No ECG changes.  Unremarkable pharmacological stress test.    H/O complete electrocardiogram 5/13/2010    H/O Doppler declining physical status with increasing dependence, etc.)    I spoke with Mrs. Guo at length  If I can help in any other way, please let me know    I spent 60 minutes, >50% in counseling and coordinating care    Derek Spicer D.O., FAAFP, Palliative Care

## 2018-12-14 NOTE — PROGRESS NOTES
Patient has been drowsy/sleeping most of the morning as she had a hard night last night with little rest. Offered to give patient enema. Stated she was not having any discomfort right now and will let us know when she needs the enema. States she is passing gas.

## 2018-12-14 NOTE — PROGRESS NOTES
Notified , pt c/o severe pain to mcnair catheter. Mcnair catheter removed per protocol; large amount of sediment found at catheter tip upon removal.  Pt with 4+ non-pitting edema to labia and redness/excoriation to jamar-area. Pt stating feels relief upon mcnair removal.  Pt unable to void on bedpan. Will continue to monitor.

## 2018-12-15 NOTE — PROGRESS NOTES
 fluconazole  100 mg Intravenous Q24H    heparin (porcine)  2,600 Units Intercatheter Once in dialysis    albumin human  12.5 g Intravenous Once in dialysis    senna  1 tablet Oral BID    heparin (porcine)  5,000 Units Subcutaneous Q8H    insulin lispro  0-6 Units Subcutaneous TID WC    insulin lispro  0-3 Units Subcutaneous Nightly    aspirin  81 mg Oral Daily    budesonide  500 mcg Nebulization BID    docusate sodium  100 mg Oral BID    ipratropium-albuterol  1 ampule Inhalation 4x daily    midodrine  10 mg Oral TID WC    montelukast  10 mg Oral Nightly    pantoprazole  40 mg Oral QAM AC    simvastatin  20 mg Oral Nightly    metoprolol tartrate  12.5 mg Oral BID    furosemide  60 mg Intravenous Q8H      Infusions:    dextrose       PRN Meds:     glucose 15 g PRN   dextrose 12.5 g PRN   glucagon (rDNA) 1 mg PRN   dextrose 100 mL/hr PRN   acetaminophen 650 mg Q6H PRN     Subjective:   Stable dyspnea, no chest pain, no abd pain, reports leg edema, and leg wounds chronic. Objective:     No intake or output data in the 24 hours ending 12/15/18 1151   Vitals:   Vitals:    12/15/18 1115   BP: 86/63   Pulse:    Resp:    Temp:    SpO2:      Physical Exam:   Gen:  awake, alert, cooperative, no apparent distress  NECK:  R sided IJ present. Supple,  LUNGS:  Diffuse crackles seen  CARDIOVASCULAR:  Irregular rate and rhythm,  S1 and S2, peripheral pulses 2+ pitting edema lower extremities  ABDOMEN: Normal BS, Non tender, non distended, no HSM noted.   MUSCULOSKELETAL:  ROM of all extremities grossly wnl  NEUROLOGIC:grossly intact  SKIN: chronic venous stasis skins, right leg skin breakdown    Data:       CBC   Recent Labs      12/13/18   0611  12/14/18   0600  12/15/18   0600   WBC  8.7  9.4  9.3   HGB  11.3*  10.4*  11.0*   HCT  36.0*  32.8*  36.0*   PLT  114*  101*  113*      BMP   Recent Labs      12/13/18   0611  12/14/18   0600  12/15/18   0600   NA  140  140  137   K  4.5  4.4  5.1   CL  94*  98*  97*

## 2018-12-15 NOTE — PROGRESS NOTES
Pt had 3 hours HD today removing 1.5L fluid. Tolerated fair, low blood pressure during treatment. Albumin and midodrine given with little effect. Both ports of temp HD catheter heparinized and capped per policy. Denies needs. Report to Deepa Calvin RN.

## 2018-12-17 PROBLEM — E44.0 MODERATE MALNUTRITION (HCC): Chronic | Status: ACTIVE | Noted: 2018-01-01

## 2018-12-17 NOTE — PLAN OF CARE
Problem: Nutrition  Goal: Optimal nutrition therapy  Outcome: Ongoing  Nutrition Problem:  Moderate malnutrition, In context of chronic illness  Intervention: Food and/or Nutrient Delivery: Start oral diet, Start ONS  Nutritional Goals: Diet will be advanced and intake will be at least 75% for LOS

## 2018-12-17 NOTE — PROGRESS NOTES
Carafate,  [] Diet/Tube Feeds   Code Status Full Code     Medications:   Medications:    magnesium oxide  400 mg Oral BID    fluconazole  100 mg Intravenous Q24H    senna  1 tablet Oral BID    heparin (porcine)  5,000 Units Subcutaneous Q8H    insulin lispro  0-6 Units Subcutaneous TID WC    insulin lispro  0-3 Units Subcutaneous Nightly    aspirin  81 mg Oral Daily    budesonide  500 mcg Nebulization BID    docusate sodium  100 mg Oral BID    ipratropium-albuterol  1 ampule Inhalation 4x daily    midodrine  10 mg Oral TID WC    montelukast  10 mg Oral Nightly    pantoprazole  40 mg Oral QAM AC    simvastatin  20 mg Oral Nightly    metoprolol tartrate  12.5 mg Oral BID    furosemide  60 mg Intravenous Q8H      Infusions:    dextrose       PRN Meds:     glucose 15 g PRN   dextrose 12.5 g PRN   glucagon (rDNA) 1 mg PRN   dextrose 100 mL/hr PRN   acetaminophen 650 mg Q6H PRN     Subjective:     No chest pain, stable dyspnea, no F/C, no abd pain, no N/V. Objective:     No intake or output data in the 24 hours ending 12/17/18 0951   Vitals:   Vitals:    12/17/18 0736   BP:    Pulse:    Resp:    Temp:    SpO2: 94%     Physical Exam:   Gen:  awake, alert, cooperative, no apparent distress  NECK:  R sided IJ present. Supple,  LUNGS:  Positive for crackles  CARDIOVASCULAR:  Irregular rate and rhythm,  S1 and S2, peripheral pulses 2+ pitting edema lower extremities  ABDOMEN: Normal BS, Non tender, non distended, no HSM noted.   MUSCULOSKELETAL:  ROM of all extremities grossly wnl  NEUROLOGIC:grossly intact  SKIN: chronic venous stasis skins, right leg skin breakdown    Data:       CBC   Recent Labs      12/15/18   0600  12/16/18   0542  12/17/18   0522   WBC  9.3  9.9  9.8   HGB  11.0*  10.9*  10.6*   HCT  36.0*  34.9*  33.7*   PLT  113*  101*  110*      BMP   Recent Labs      12/15/18   0600  12/16/18   0542  12/17/18   0522   NA  137  137  139   K  5.1  4.4  4.3   CL  97*  99  101   CO2  24  23  25   BUN

## 2018-12-17 NOTE — PROGRESS NOTES
Nutrition Assessment    Type and Reason for Visit: Reassess    Nutrition Recommendations:   · Resume diet s/p HD cath placement  · Start renal oral supplement BID once diet is advanced     Nutrition Assessment: Pt moderately malnourished AEB moderate muscle loss and poor intake. Per pt, her appetite is a little better but intake remains poor. NPO for tunneled HD cath to be placed today. Pt agreed to try a supplement once diet is advanced. Wt loss noted based on admit wt's and likely r/t fluid removal with HD. 2-4+ pitting edema continues. Malnutrition Assessment:  · Malnutrition Status: Meets the criteria for moderate malnutrition  · Context: Chronic illness  · Findings of the 6 clinical characteristics of malnutrition (Minimum of 2 out of 6 clinical characteristics is required to make the diagnosis of moderate or severe Protein Calorie Malnutrition based on AND/ASPEN Guidelines):  1. Energy Intake-Less than 75% of estimated energy requirement for greater than or equal to 1 month, greater than or equal to 1 month    2. Weight Loss-No significant weight loss, in 1 month  3. Fat Loss-No significant subcutaneous fat loss, Orbital  4. Muscle Loss-Moderate muscle mass loss, Clavicles (pectoralis and deltoids)  5. Fluid Accumulation-Moderate to severe fluid accumulation, Extremities, Generalized (2/2 lymphedema, renal dysfunction-not r/t malnutrition )  6.  Strength-Not measured    Nutrition Risk Level: High    Nutrient Needs:  · Estimated Daily Total Kcal: 5933-5577  · Estimated Daily Protein (g): 68-91  · Estimated Daily Total Fluid (ml/day): per nephrology     Nutrition Diagnosis:   · Problem:  Moderate malnutrition, In context of chronic illness  · Etiology: related to Renal dysfunction, Other (Comment) (loss of appetite )     Signs and symptoms:  as evidenced by Intake 0-25%, Intake 25-50%, Diet history of poor intake, Presence of wounds, Moderate muscle loss    Objective Information:  · Wound Type:

## 2018-12-18 NOTE — PROGRESS NOTES
NEBULIZER FOUR TIMES DAILY  torsemide (DEMADEX) 20 MG tablet, TK 1 T PO  BID  metolazone (ZAROXOLYN) 2.5 MG tablet, TK 1 T PO  EVERY OTHER DAY ONE HOUR BEFORE TORSEMIDE  allopurinol (ZYLOPRIM) 100 MG tablet, TK 1 T PO  D  Cyanocobalamin (VITAMIN B 12 PO), Take 500 mcg by mouth daily   Respiratory Therapy Supplies (NEBULIZER AIR TUBE/PLUGS) MISC, 1 Device by Does not apply route as needed (as needed)  omeprazole (PRILOSEC) 40 MG capsule, Take 40 mg by mouth daily   potassium chloride (K-DUR) 10 MEQ tablet, Take 10 mEq by mouth daily. multivitamin (THERAGRAN) per tablet, Take 1 tablet by mouth daily. simvastatin (ZOCOR) 20 MG tablet, Take 20 mg by mouth nightly. aspirin 81 MG tablet, Take 81 mg by mouth daily.     vitamin D (ERGOCALCIFEROL) 42251 UNITS CAPS capsule, Take 50,000 Units by mouth once a week 10/12/17 Patient states she takes this on Thursdays  ferrous sulfate 325 (65 FE) MG tablet, Take 325 mg by mouth daily (with breakfast)     Current Medications  Current Facility-Administered Medications   Medication Dose Route Frequency Provider Last Rate Last Dose    torsemide (DEMADEX) tablet 40 mg  40 mg Oral Daily Deborah Davies, DO   40 mg at 12/18/18 1022    magnesium oxide (MAG-OX) tablet 400 mg  400 mg Oral BID Deborah Davies DO   400 mg at 12/18/18 1023    fluconazole (DIFLUCAN) 100 mg in 0.9 % NaCl 50 mL premix IVPB  100 mg Intravenous Q24H Yaneth Santoyo MD   Stopped at 12/17/18 1345    senna (SENOKOT) tablet 8.6 mg  1 tablet Oral BID Geri Cole MD   8.6 mg at 12/18/18 1023    heparin (porcine) injection 5,000 Units  5,000 Units Subcutaneous Q8H Marlin Leyva MD   5,000 Units at 12/17/18 0604    insulin lispro (HUMALOG) injection vial 0-6 Units  0-6 Units Subcutaneous TID WC Marlin Leyva MD   1 Units at 12/16/18 1806    insulin lispro (HUMALOG) injection vial 0-3 Units  0-3 Units Subcutaneous Nightly Marlin Leyva MD   1 Units at 12/17/18 2224    glucose (GLUTOSE) 40 % oral

## 2018-12-19 NOTE — FLOWSHEET NOTE
Pt asystole on the monitor. This RN and Greer Stoll RN at bedside to verify TOD. No heart tones audible. TOD 0104. Son is at bedside. Emotional support provided.

## 2018-12-19 NOTE — PROGRESS NOTES
At around 23:53 Patient's BP gradually getting lower with map in the 50's. Her Heart rate fluctuates between 128 to 130. Axillary temp 99.1. She was very lethargic as well. Dr Laura Clarke made aware and requested to come and see patient. Dr Nelda Hernandez also paged and updated on patients current status. It was decided that patient should be move to the ICU to be placed on levophed. Family was informed.

## 2018-12-19 NOTE — DISCHARGE SUMMARY
Discharge Summary    Name:  Sommer Acevedo DOB/Age/Sex: 1940  ([de-identified] y.o. female)   MRN & CSN:  [id number] & [id number] Admission Date/Time: 2018  8:01 PM   Attending:  Bharat Severino MD Discharging Physician: Sumaya Meyers MD     HPI:     Per H&P: Sommer Acevedo is a [de-identified] y.o. female who presents with 1.5 days hx of progressive SOB. Uses 3 L NC chronically. Had some symptoms yesterday but significantly worse today with pulse ox in low 80s on NRB mask. Had to be place on BIPAP with some improvement. She was so SOB that she had trouble speaking in sentences. Hospital Course:     Cindy James was admitted 8 days ago - she presented with shortness of breath and was seen by cardiology soon after admission and was noted to have E=end-stage congestive heart failure due to valvular disease, severe mitral stenosis, and was not a candidate for any intervention per cardiology. On admission she was also noted to have CKD4 by nephrology as well as an DEMETRIUS. She was started on dialysis while here to remove some fluid. Cardiology felt that she has a poor prognosis. I was covering at night when she developed an unstable intermittent wide complex tachycardia with hypotension. She became increasingly unstable. Cardiology was contacted. Her son then decided to change her code status to [facility]. The patient  peacefully tonight. PROBLEM LIST    Acute on chronic diastolic CHF exacerbation  -she did not respond to diuresis and had dialysis this admission     Paroxysmal A. Fib     DM type II      COPD  Patient not in exacerbation     Dyslipidemia     Chronic macrocytic anemia     Chronic kidney disease - stage IV with DEMETRIUS either due to ATN or due to cardiorenal syndrome    Dr. Roman Severe cardiology note from  copied and pasted below    1.  End stage HFrEF/ HFpEF/ - with grade III diastolic dysfunction: in fluid overload:   negative  Fluid balance of 4L HF is related to the severe VHD- cautious fluid removal - on lasix and low dose b blocker:  using midodrine to assist with BP- guarded condition and palliative care now has been consulted-    2. Severe Pulmonary HTN:   RVSP 63 mm HG   3. Paroxysmal afib: rate control only   Not an candidate for coagulation    4. VHD h/o AVR: moderate- severe MS/ moderate MR /moderate to severe TR - she is not a candidate for repair or replacement. 5. DEMETRIUS- getting HD- renal following   6. Palliative consult       Consults this admission:  IP CONSULT TO NEPHROLOGY  IP CONSULT TO HOSPITALIST  IP CONSULT TO NEPHROLOGY  IP CONSULT TO CARDIOLOGY  IP CONSULT TO INTERVENTIONAL RADIOLOGY  IP CONSULT TO INTERVENTIONAL RADIOLOGY  IP CONSULT TO PALLIATIVE CARE  IP CONSULT TO PALLIATIVE CARE  IP CONSULT TO OB GYN  IP CONSULT TO INTERVENTIONAL RADIOLOGY  IP CONSULT TO CARDIOLOGY    Discharge Instruction:     Patient       Discharge Medications:      Jairo Escobedo   Home Medications listed in EHR prior to admission SOW:[id number]    Printed on:18 0944   Medication Information                      albuterol sulfate HFA (PROAIR HFA) 108 (90 Base) MCG/ACT inhaler  Inhale 2 puffs into the lungs 4 times daily             allopurinol (ZYLOPRIM) 100 MG tablet  TK 1 T PO  D             aspirin 81 MG tablet  Take 81 mg by mouth daily.                budesonide (PULMICORT) 0.5 MG/2ML nebulizer suspension  Take 2 mLs by nebulization 2 times daily Dx Code COPD J44.9             Cyanocobalamin (VITAMIN B 12 PO)  Take 500 mcg by mouth daily              docusate sodium (COLACE, DULCOLAX) 100 MG CAPS  Take 100 mg by mouth 2 times daily             ferrous sulfate 325 (65 FE) MG tablet  Take 325 mg by mouth daily (with breakfast)              insulin lispro (HUMALOG) 100 UNIT/ML injection vial  Inject 0-6 Units into the skin 3 times daily (with meals) Glucose: Dose:               No Insulin  140-199 1 Unit  200-249 2 Units  250-299 3 Units  300-349 4 Units  350-399 5 Units  Over 399 6 Units ipratropium-albuterol (DUONEB) 0.5-2.5 (3) MG/3ML SOLN nebulizer solution  USE 3 ML VIA NEBULIZER FOUR TIMES DAILY             metolazone (ZAROXOLYN) 2.5 MG tablet  TK 1 T PO  EVERY OTHER DAY ONE HOUR BEFORE TORSEMIDE             metoprolol tartrate (LOPRESSOR) 25 MG tablet  Take 1 tablet by mouth 2 times daily             midodrine (PROAMATINE) 10 MG tablet  Take 1 tablet by mouth 3 times daily (with meals) Hold for SBP > 110             montelukast (SINGULAIR) 10 MG tablet  Take 10 mg by mouth nightly             multivitamin (THERAGRAN) per tablet  Take 1 tablet by mouth daily. omeprazole (PRILOSEC) 40 MG capsule  Take 40 mg by mouth daily              OXYGEN  Inhale 2 L into the lungs             potassium chloride (K-DUR) 10 MEQ tablet  Take 10 mEq by mouth daily. Respiratory Therapy Supplies (NEBULIZER AIR TUBE/PLUGS) MISC  1 Device by Does not apply route as needed (as needed)             Respiratory Therapy Supplies (NEBULIZER COMPRESSOR) KIT  1 kit by Does not apply route once for 1 dose             simvastatin (ZOCOR) 20 MG tablet  Take 20 mg by mouth nightly.                torsemide (DEMADEX) 20 MG tablet  TK 1 T PO  BID             vitamin D (ERGOCALCIFEROL) 85042 UNITS CAPS capsule  Take 50,000 Units by mouth once a week 10/12/17 Patient states she takes this on Thursdays                 Objective Findings at Discharge:   BP (!) 68/49   Pulse 127   Temp 99.1 °F (37.3 °C)   Resp (!) 40   Ht 5' (1.524 m)   Wt 229 lb 4.5 oz (104 kg)   SpO2 98%   BMI 44.78 kg/m²            PHYSICAL EXAM no breath sounds    LABS:    CBC:   Lab Results   Component Value Date    WBC 10.5 12/18/2018    HGB 11.2 12/18/2018    HCT 35.4 12/18/2018    .9 12/18/2018     12/18/2018     BMP:   Lab Results   Component Value Date     12/18/2018    K 4.7 12/18/2018     12/18/2018    CO2 23 12/18/2018    PHOS 3.6 10/30/2018    BUN 52 12/18/2018    CREATININE 4.8 12/18/2018 minutes    SEDATION:  None    FLUOROSCOPY DOSE AND TYPE OR TIME AND EXPOSURES:  0.2 minutes    FINDINGS:  Fluoroscopic image demonstrates the tip of the port at the cavo-atrial  junction . Impression:       Right IJ tunneled PermCath placement. Okay to use    RECOMMENDATIONS:  Okay to use       XR ABDOMEN (KUB) (SINGLE AP VIEW) [[id number]] Collected: 12/14/18 0341     Order Status: Completed Updated: 12/14/18 0345     Narrative:       EXAMINATION:  SINGLE SUPINE XRAY VIEW(S) OF THE ABDOMEN    12/14/2018 2:13 am    COMPARISON:  None. HISTORY:  ORDERING SYSTEM PROVIDED HISTORY: abdominal pain for portable  TECHNOLOGIST PROVIDED HISTORY:  Reason for exam:->abdominal pain for portable  Ordering Physician Provided Reason for Exam: abdominal pain for portable  Acuity: Acute  Type of Exam: Initial  Mechanism of Injury: abdominal pain for portable  Relevant Medical/Surgical History: abdominal pain for portable    FINDINGS:  Moderate stool burden. Nonobstructive bowel gas pattern. Degenerative  changes in spine and hips. Calcifications overlying the right upper  quadrant. Partially imaged median sternotomy wires. Surgical clips in the  left lung base. Impression:       Moderate stool burden with nonobstructive bowel gas pattern. Calcifications in the right upper quadrant likely represent cholelithiasis. XR CHEST PORTABLE [[id number]] Collected: 12/12/18 1129     Order Status: Completed Updated: 12/12/18 1859     Narrative:       EXAMINATION:  SINGLE XRAY VIEW OF THE CHEST    12/12/2018 10:52 am    COMPARISON:  October 22, 2018    HISTORY:  ORDERING SYSTEM PROVIDED HISTORY: s/p temp cath, eval fpr ptx  TECHNOLOGIST PROVIDED HISTORY:  Reason for exam:->s/p temp cath, eval fpr ptx  Ordering Physician Provided Reason for Exam: sob  Acuity: Unknown  Type of Exam: Unknown  Relevant Medical/Surgical History: cad   copd    FINDINGS:  Right IJ central catheter tip terminates over the SVC.   There is no

## 2018-12-20 LAB
EKG ATRIAL RATE: 166 BPM
EKG ATRIAL RATE: 340 BPM
EKG ATRIAL RATE: 64 BPM
EKG DIAGNOSIS: NORMAL
EKG Q-T INTERVAL: 254 MS
EKG Q-T INTERVAL: 364 MS
EKG Q-T INTERVAL: 384 MS
EKG QRS DURATION: 140 MS
EKG QRS DURATION: 158 MS
EKG QRS DURATION: 96 MS
EKG QTC CALCULATION (BAZETT): 420 MS
EKG QTC CALCULATION (BAZETT): 469 MS
EKG QTC CALCULATION (BAZETT): 567 MS
EKG R AXIS: 0 DEGREES
EKG R AXIS: 166 DEGREES
EKG R AXIS: 176 DEGREES
EKG T AXIS: -1 DEGREES
EKG T AXIS: 1 DEGREES
EKG T AXIS: 8 DEGREES
EKG VENTRICULAR RATE: 100 BPM
EKG VENTRICULAR RATE: 131 BPM
EKG VENTRICULAR RATE: 165 BPM

## 2024-03-15 NOTE — PROGRESS NOTES
breakfast    sodium chloride flush  10 mL Intravenous 2 times per day    docusate sodium  100 mg Oral BID    heparin (porcine)  5,000 Units Subcutaneous 3 times per day    sodium polystyrene  15 g Oral Once    insulin lispro  0-6 Units Subcutaneous TID WC    insulin lispro  0-3 Units Subcutaneous Nightly      Infusions:    DOPamine 5 mcg/kg/min (10/23/18 1119)    furosemide (LASIX) 1mg/ml infusion 5 mg/hr (10/23/18 1012)    dextrose       PRN Meds:   ipratropium-albuterol 1 ampule Q4H PRN   sodium chloride flush 10 mL PRN   ondansetron 4 mg Q6H PRN   glucose 15 g PRN   dextrose 12.5 g PRN   glucagon (rDNA) 1 mg PRN   dextrose 100 mL/hr PRN       Recent Labs      10/21/18   1942  10/22/18   0730   WBC  8.2  5.6   HGB  11.3*  11.6*   HCT  36.5*  37.1   PLT  124*  115*      Recent Labs      10/21/18   1942  10/22/18   0730   NA  140  142   K  5.4*  5.0   CL  99  99   CO2  30  30   BUN  76*  76*   CREATININE  2.7*  2.5*     Recent Labs      10/21/18   1942  10/22/18   0730   AST  38*  17   ALT  10  8*   BILITOT  0.8  0.9   ALKPHOS  57  64     No results for input(s): INR in the last 72 hours.   Recent Labs      10/21/18   1942  10/22/18   0134  10/22/18   0730   CKTOTAL  73   --    --    TROPONINT  0.065*  0.062*  0.051*      Imaging reviewed    Electronically signed by Aby Ch MD on 10/23/2018 at 12:34 PM No

## 2024-11-13 NOTE — PROGRESS NOTES
Immediate Brief Procedure Note    Patient: Becka Duckworth    Pre-op Dx:   1. Chronic idiopathic axonal polyneuropathy         Post-op Dx:    1. Chronic idiopathic axonal polyneuropathy         Procedure:  Lumbar Puncture,  L3-4    Surgeon: Griffin Rose MD    Assistants: none    Anesthesia Staff: none    Anesthesia Type: local    Findings:  Successful Procedure    Estimated Blood Loss: None    Complications: None    Specimens Removed: Spinal Fluid 16 cc       simvastatin  20 mg Oral Nightly    pantoprazole  40 mg Oral QAM AC    montelukast  10 mg Oral Nightly    therapeutic multivitamin-minerals  1 tablet Oral Daily    metolazone  2.5 mg Oral Every Other Day    vitamin B-12  500 mcg Oral Daily    ferrous sulfate  325 mg Oral Daily with breakfast    sodium chloride flush  10 mL Intravenous 2 times per day    docusate sodium  100 mg Oral BID    heparin (porcine)  5,000 Units Subcutaneous 3 times per day    sodium polystyrene  15 g Oral Once    insulin lispro  0-6 Units Subcutaneous TID WC    insulin lispro  0-3 Units Subcutaneous Nightly      Infusions:    DOPamine 2.5 mcg/kg/min (10/24/18 0925)    furosemide (LASIX) 1mg/ml infusion 5 mg/hr (10/24/18 0427)    dextrose       PRN Meds:     ipratropium-albuterol 1 ampule Q4H PRN   sodium chloride flush 10 mL PRN   ondansetron 4 mg Q6H PRN   glucose 15 g PRN   dextrose 12.5 g PRN   glucagon (rDNA) 1 mg PRN   dextrose 100 mL/hr PRN       Recent Labs      10/21/18   1942  10/22/18   0730   WBC  8.2  5.6   HGB  11.3*  11.6*   HCT  36.5*  37.1   PLT  124*  115*      Recent Labs      10/22/18   0730  10/23/18   1155  10/24/18   0625   NA  142  142  137   K  5.0  4.4  4.5   CL  99  96*  96*   CO2  30  31  32   PHOS   --   3.2  2.9   BUN  76*  79*  77*   CREATININE  2.5*  2.5*  2.6*     Recent Labs      10/21/18   1942  10/22/18   0730   AST  38*  17   ALT  10  8*   BILITOT  0.8  0.9   ALKPHOS  57  64     No results for input(s): INR in the last 72 hours.   Recent Labs      10/21/18   1942  10/22/18   0134  10/22/18   0730   CKTOTAL  73   --    --    TROPONINT  0.065*  0.062*  0.051*      Imaging reviewed    Electronically signed by Beck Del Real MD on 10/24/2018 at 1:09 PM